# Patient Record
Sex: FEMALE | Race: WHITE | NOT HISPANIC OR LATINO | Employment: FULL TIME | URBAN - METROPOLITAN AREA
[De-identification: names, ages, dates, MRNs, and addresses within clinical notes are randomized per-mention and may not be internally consistent; named-entity substitution may affect disease eponyms.]

---

## 2024-09-17 ENCOUNTER — LAB (OUTPATIENT)
Dept: LAB | Facility: CLINIC | Age: 34
End: 2024-09-17
Payer: COMMERCIAL

## 2024-09-17 ENCOUNTER — OFFICE VISIT (OUTPATIENT)
Dept: SURGERY | Facility: CLINIC | Age: 34
End: 2024-09-17
Payer: COMMERCIAL

## 2024-09-17 VITALS
HEIGHT: 63 IN | SYSTOLIC BLOOD PRESSURE: 124 MMHG | WEIGHT: 230 LBS | DIASTOLIC BLOOD PRESSURE: 76 MMHG | BODY MASS INDEX: 40.75 KG/M2

## 2024-09-17 DIAGNOSIS — F32.A ANXIETY AND DEPRESSION: ICD-10-CM

## 2024-09-17 DIAGNOSIS — E88.819 INSULIN RESISTANCE: ICD-10-CM

## 2024-09-17 DIAGNOSIS — M79.7 FIBROMYALGIA: ICD-10-CM

## 2024-09-17 DIAGNOSIS — F41.9 ANXIETY AND DEPRESSION: ICD-10-CM

## 2024-09-17 DIAGNOSIS — E66.01 SEVERE OBESITY (BMI >= 40) (H): Primary | ICD-10-CM

## 2024-09-17 DIAGNOSIS — K21.9 GASTROESOPHAGEAL REFLUX DISEASE WITHOUT ESOPHAGITIS: ICD-10-CM

## 2024-09-17 DIAGNOSIS — E66.01 SEVERE OBESITY (BMI >= 40) (H): ICD-10-CM

## 2024-09-17 LAB
ALBUMIN SERPL BCG-MCNC: 4.3 G/DL (ref 3.5–5.2)
ALP SERPL-CCNC: 129 U/L (ref 40–150)
ALT SERPL W P-5'-P-CCNC: 10 U/L (ref 0–50)
ANION GAP SERPL CALCULATED.3IONS-SCNC: 10 MMOL/L (ref 7–15)
AST SERPL W P-5'-P-CCNC: 20 U/L (ref 0–45)
BILIRUB SERPL-MCNC: 0.3 MG/DL
BUN SERPL-MCNC: 8.6 MG/DL (ref 6–20)
CALCIUM SERPL-MCNC: 8.7 MG/DL (ref 8.8–10.4)
CHLORIDE SERPL-SCNC: 105 MMOL/L (ref 98–107)
CREAT SERPL-MCNC: 0.8 MG/DL (ref 0.51–0.95)
EGFRCR SERPLBLD CKD-EPI 2021: >90 ML/MIN/1.73M2
GLUCOSE SERPL-MCNC: 97 MG/DL (ref 70–99)
HBA1C MFR BLD: 5.4 % (ref 0–5.6)
HCO3 SERPL-SCNC: 25 MMOL/L (ref 22–29)
IRON BINDING CAPACITY (ROCHE): 330 UG/DL (ref 240–430)
IRON SATN MFR SERPL: 12 % (ref 15–46)
IRON SERPL-MCNC: 40 UG/DL (ref 37–145)
POTASSIUM SERPL-SCNC: 4.1 MMOL/L (ref 3.4–5.3)
PROT SERPL-MCNC: 7.6 G/DL (ref 6.4–8.3)
PTH-INTACT SERPL-MCNC: 58 PG/ML (ref 15–65)
SODIUM SERPL-SCNC: 140 MMOL/L (ref 135–145)
VIT D+METAB SERPL-MCNC: 16 NG/ML (ref 20–50)

## 2024-09-17 PROCEDURE — 83550 IRON BINDING TEST: CPT

## 2024-09-17 PROCEDURE — 83036 HEMOGLOBIN GLYCOSYLATED A1C: CPT

## 2024-09-17 PROCEDURE — 82607 VITAMIN B-12: CPT

## 2024-09-17 PROCEDURE — 83970 ASSAY OF PARATHORMONE: CPT

## 2024-09-17 PROCEDURE — 82306 VITAMIN D 25 HYDROXY: CPT

## 2024-09-17 PROCEDURE — 83540 ASSAY OF IRON: CPT

## 2024-09-17 PROCEDURE — 99205 OFFICE O/P NEW HI 60 MIN: CPT | Performed by: FAMILY MEDICINE

## 2024-09-17 PROCEDURE — 80053 COMPREHEN METABOLIC PANEL: CPT

## 2024-09-17 PROCEDURE — 36415 COLL VENOUS BLD VENIPUNCTURE: CPT

## 2024-09-17 RX ORDER — DULOXETIN HYDROCHLORIDE 60 MG/1
60 CAPSULE, DELAYED RELEASE ORAL
COMMUNITY
Start: 2022-02-10

## 2024-09-17 RX ORDER — HYDROXYZINE HYDROCHLORIDE 10 MG/1
TABLET, FILM COATED ORAL
COMMUNITY
Start: 2017-10-18

## 2024-09-17 RX ORDER — CLOBETASOL PROPIONATE 0.5 MG/ML
SOLUTION TOPICAL
COMMUNITY
Start: 2024-06-06

## 2024-09-17 RX ORDER — KETOCONAZOLE 20 MG/ML
SHAMPOO TOPICAL
COMMUNITY
Start: 2024-06-06

## 2024-09-17 RX ORDER — FLUOXETINE 40 MG/1
40 CAPSULE ORAL
COMMUNITY
Start: 2023-04-26

## 2024-09-17 RX ORDER — DULOXETIN HYDROCHLORIDE 30 MG/1
CAPSULE, DELAYED RELEASE ORAL
COMMUNITY
Start: 2023-05-10

## 2024-09-17 NOTE — LETTER
"2024      Alexandra Lira  [address]  [facility] [address]      Dear Colleague,    Thank you for referring your patient, Alexandra Lira, to the [facility] [place]. Please see a copy of my visit note below.        New Medical Weight Management Consult    PATIENT:  Alexandra Lira  MRN:         [id number]  :         1990  YIN:         2024    Dear Dr. Lua,    I had the pleasure of seeing your patient, Alexandra Lira. Full intake/assessment was done to determine barriers to weight loss success and develop a treatment plan. Alexandra Lira is a 34 year old female interested in treatment of medical problems associated with excess weight. She has a height of 5' 2.5\", a weight of 230 lbs 0 oz, and the calculated Body mass index is 41.4 kg/m . Injectables not covered under her 's insurance plan. She is changing to [facility] insurance soon. She was told that she had a fatty liver.     ASSESSMENT/PLAN:  Sedentary  Symproms of insulin resistance  Chronic pain limiting exercise  High Stress-using DBT coping skills  Sleep deprivation-playing video games  Skipping meals  Daily [employer]    Consider anti-inflammatory approach  Consistent stretching/movement  Changing to [facility] insurance in -will have GLP-1 RA avail (hopefully)  Labs  Liver ultrasound  Considering bariatric surgery then breast reduction      She has the following co-morbidities:        2024    10:11 AM   --   I have the following health issues associated with obesity None of the above   I have the following symptoms associated with obesity None of the above               2024    10:11 AM   Referring Provider   Please name the provider who referred you to Medical Weight Management  If you do not know, please answer \"I Don't Know\" [facility]           2024    10:11 AM   Weight History   How concerned are you about your weight? Very Concerned   I became overweight As " an Adult   The following factors have contributed to my weight gain Other   Please list the other factors pregnancy   I have tried the following methods to lose weight Exercise    Other   Please list the other methods nuum   My lowest weight since age 18 was 223   My highest weight since age 18 was 235   The most weight I have ever lost was (lbs) 15   I have the following family history of obesity/being overweight My father is overweight   How has your weight changed over the last year? Gained   How many pounds? 15           9/17/2024    10:11 AM   Diet Recall Review with Patient   If you do eat lunch, what types of food do you typically eat? salads   If you do snack, what types of food do you typically eat? ice cream   How many glasses of juice do you drink in a typical day? 1   How many of glasses of milk do you drink in a typical day? 1   If you do drink milk, what type? 2%   How many 8oz glasses of sugar containing drinks such as Todd-Aid/sweet tea do you drink in a day? 0   How many cans/bottles of sugar pop/soda/tea/sports drinks do you drink in a day? 1   How many cans/bottles of diet pop/soda/tea or sports drink do you drink in a day? 1   How often do you have a drink of alcohol? Monthly or Less   If you do drink, how many drinks might you have in a day? 1 or 2           9/17/2024    10:11 AM   Eating Habits   Generally, my meals include foods like these bread, pasta, rice, potatoes, corn, crackers, sweet dessert, pop, or juice A Few Times a Week   Generally, my meals include foods like these fried meats, brats, burgers, french fries, pizza, cheese, chips, or ice cream A Few Times a Week   Eat fast food (like McDonalds, [employer], Taco Bell) Once a Week   Eat at a buffet or sit-down restaurant Once a Week   Eat most of my meals in front of the TV or computer A Few Times a Week   Often skip meals, eat at random times, have no regular eating times Once a Week   Rarely sit down for a meal but snack or graze  throughout Once a Week   Eat extra snacks between meals A Few Times a Week   Eat most of my food at the end of the day A Few Times a Week   Eat in the middle of the night or wake up at night to eat A Few Times a Week   Eat extra snacks to prevent or correct low blood sugar A Few Times a Week   Eat to prevent acid reflux or stomach pain Once a Week   Worry about not having enough food to eat Never   I eat when I am depressed A Few Times a Week   I eat when I am stressed A Few Times a Week   I eat when I am bored A Few Times a Week   I eat when I am anxious Once a Week   I eat when I am happy or as a reward Once a Week   I feel hungry all the time even if I just have eaten Never   Feeling full is important to me Never   I finish all the food on my plate even if I am already full Never   I can't resist eating delicious food or walk past the good food/smell Never   I eat/snack without noticing that I am eating Once a Week   I eat when I am preparing the meal Never   I eat more than usual when I see others eating Never   I have trouble not eating sweets, ice cream, cookies, or chips if they are around the house A Few Times a Week   I think about food all day Never   What foods, if any, do you crave? Sweets/Candy/Chocolate   Please list any other foods you crave? sweets           9/17/2024    10:11 AM   Amount of Food   I feel out of control when eating Never   I eat a large amount of food, like a loaf of bread, a box of cookies, a pint/quart of ice cream, all at once Never   I eat a large amount of food even when I am not hungry Never   I eat rapidly Never   I eat alone because I feel embarrassed and do not want others to see how much I have eaten Never   I eat until I am uncomfortably full Never   I feel bad, disgusted, or guilty after I overeat Never           9/17/2024    10:11 AM   Activity/Exercise History   How much of a typical 12 hour day do you spend sitting? Half the Day   How much of a typical 12 hour day do  you spend lying down? Half the Day   How much of a typical day do you spend walking/standing? Half the Day   How many hours (not including work) do you spend on the TV/Video Games/Computer/Tablet/Phone? 4-5 Hours   How many times a week are you active for the purpose of exercise? 2-3 Times a Week   What keeps you from being more active? Lack of Time   How many total minutes do you spend doing some activity for the purpose of exercising when you exercise? 15-30 Minutes       PAST MEDICAL HISTORY:  Past Medical History:   Diagnosis Date     Anxiety and depression      Autism spectrum      Fibromyalgia      Gastroesophageal reflux disease without esophagitis      Hirsutism      Insulin resistance      Irregular menses      Positive CORRIE (antinuclear antibody)      Post partum depression      Severe obesity (BMI >= 40) (H)      Skin tag            9/17/2024    10:11 AM   Work/Social History Reviewed With Patient   My employment status is Full-Time   My job is [employer]   How much of your job is spent on the computer or phone? 100%   How many hours do you spend commuting to work daily? 0   What is your marital status? /In a Relationship   If in a relationship, is your significant other overweight? No   If you have children, are they overweight? No   Who do you live with? children/    Who does the food shopping? self           9/17/2024    10:11 AM   Mental Health History Reviewed With Patient   Have you ever been physically or sexually abused? No   How often in the past 2 weeks have you felt little interest or pleasure in doing things? Not at all   Over the past 2 weeks how often have you felt down, depressed, or hopeless? Not at all           9/17/2024    10:11 AM   Sleep History Reviewed With Patient   How many hours do you sleep at night? 5       MEDICATIONS:   Current Outpatient Medications   Medication Sig Dispense Refill     clobetasol (TEMOVATE) 0.05 % external solution Apply to scalp daily as needed  "itching.       DULoxetine (CYMBALTA) 30 MG capsule        DULoxetine (CYMBALTA) 60 MG capsule Take 60 mg by mouth.       FLUoxetine (PROZAC) 40 MG capsule Take 40 mg by mouth.       hydrOXYzine HCl (ATARAX) 10 MG tablet        ketoconazole (NIZORAL) 2 % external shampoo Lather on damp scalp, leave on for 5min, then rinse with water.         ALLERGIES:   Allergies   Allergen Reactions     Erythromycin Hives, Itching and Unknown     Hepatitis B Vaccine Recomb (3-Antigen) Unknown     Pertussis Vaccine Unknown     Bupropion Anxiety, Confusion, Difficulty breathing, Fatigue, Hives, Itching, Other (See Comments) and Rash     Possible allergy, or the Keflex   Possible allergy, or the Keflex    Possible allergy, or the Keflex     Cephalexin Anxiety, Confusion, Fatigue, Hives, Itching, Muscle Pain (Myalgia), Other (See Comments) and Rash     Possible allergy or Wellbutrin    Possible allergy or Wellbutrin     Possible allergy or Wellbutrin    Pt tolerated IV Cefazolin/Ancef at  without issue  &        PHYSICAL EXAM:  /76   Ht 1.588 m (5' 2.5\")   Wt 104.3 kg (230 lb)   LMP 2024 (Exact Date)   BMI 41.40 kg/m      Waist circumference: 39 cm (hips 50)    Wt Readings from Last 4 Encounters:   24 104.3 kg (230 lb)   Pleasant and in no distress  Neck 13\" Mallampati 3+   HEENT: poor dental hygiene  Heart regular  Lungs clear  Abdominal circumference 39\"  Respirations unlabored  Gait normal    FOLLOW-UP:   scheduled.    TIME: 60 min spent on evaluation, management, counseling, education, & motivational interviewing     Sincerely,    Vidya Abreu MD            Again, thank you for allowing me to participate in the care of your patient.        Sincerely,        Vidya Abreu MD  "

## 2024-09-17 NOTE — PATIENT INSTRUCTIONS
[facility]    Remember to: call [phone] to schedule your ultrasound    -Eat 3 meals a day (not 2, not 5) Chew your food well/SLOW down  -Eat your protein first  -Be a water drinker/Minize liquid calories (no regular pop, no juice) skim or 1% milk OK  -Sleep 7-8 hours each night. Address sleep if problematic  -Stress management is important. Address if problematic  -Move-8000 steps daily Muscle: maintain your muscle mass (strength training 2X/wk)  -Wheat, not white (bread, pasta, crackers, tatyana, bagels, tortillas, rice)  -Limit restaurant, cafeteria, take out, drive through to 2 times per week or less  -Minimize caffeine, alcohol, and night-time snacking  -Consider keeping a food diary (i.e. My Fitness Pal, Lose It, or other food tracker)  -Follow up with the dietitian      **Some lean proteins: chicken, turkey, tuna, salmon, crab, fish, shrimp, scallops, lobster, lean cuts of beef and pork, luncheon meats, veggie burgers, beans (black, lima, garbanzo, burton, kidney, refried), chile, cottage cheese, string cheese, other cheese, eggs, tofu, peanut butter, nuts, vegan crumbles, greek yogurt    MEDICATIONS FOR WEIGHT LOSS    PHENTERMINE (Adipex): approved in 1959 for appetite suppression.  It has stimulant effects and cannot be used with Ritalin, Concerta, or other stimulants.  It is not addictive although it's chemically related to amphetamines.  Amphetamines are addictive. The most common side effects are dry mouth, increased energy and concentration, increased pulse, and constipation.  You should not take phentermine if you have glaucoma, hyperthyroidism, or uncontrolled/untreated hypertension.  $24-$30 for 90 tablets    PHENDIMETRAZINE (Bontril): Appetite suppressant/sympathomimetic.  Controlled substance.  Side effects and contraindications similar to phentermine.  $45-$60 for 3 month supply    TOPIRAMATE (Topamax): Anti-seizure medication, also used to prevent migraines.  Side effects include  paresthesia, glaucoma, altered concentration, attention difficulties, memory and speech problems, metabolic acidosis, depression, increase in body temperature and decrease sweating, kidney stones, and weight loss.  Do not take Topamax while taking Depakote as this can cause high ammonia levels.  You must have reliable birth control as Topamax can cause birth defects.  Discontinue slowly to avoid seizure.  Insurance usually covers Topiramate.    QSYMIA (Phentermine + Topamax):  See above information about phentermine and Topamax.  Most common side effects are paresthesia, dizziness, distortion of taste, insomnia, constipation, and dry mouth.  $150-$220 per month    DIETHYLPROPION (Tenuate): Sympathomimetic amine.  Appetite suppressant.  Doses 25 mg before meals or 75 mg per day.  Most common side effects are hypertension, palpitations, EKG changes, and increased seizures in epileptics.  There can be a possible adverse reaction with alcohol.  $70-$90 per 3 months    XENICAL(Orlistat) (Santi OTC): Approved in 1999.  A fat-blocker.  It reduces absorption of fat by approximately 30%.  It has beneficial effects on lipid levels.  Side effects include diarrhea, abdominal cramping, fecal incontinence, oily spotting, and flatus with discharge.  Side effects are minimized if the patient limits their dietary fat to no more than 30% of their diet.  Patients must take a multivitamin daily to avoid vitamin D, E, A, and K deficiency.  $120 per month    CONTRAVE (Naltrexone/Bupropion): Approved in 2014.  It is a combination pill including an opioid receptor blocker and a long-standing antidepressant.  Most common side effects include nausea, constipation, headache, vomiting, dizziness, trouble sleeping, dry mouth, and diarrhea.  With all antidepressants watch for mood changes and suicide ideation.  Bupropion has been known to lower the seizure threshold in those prone to seizures.  It should not be used in a patient with a recent  history of bulimia. It has been associated with liver damage from taking higher than recommended doses.  Do not use countrave if you have taken opioid medications or opioid street drugs in the past 7-10 days, if you are currently on opioids, methadone, or if you are pregnant.  Do not use contrave if you have recently stopped using alcohol or benzodiazepines.  Taper off contrave slowly.  Dosing: titrate up to 2 tablets twice daily of the Naltrexone 8 mg/ Bupropion 90 mg tablets.  $200 for 90 tablets    SAXENDA (Liraglutide (called Victoza for Type 2 Diabetes): A daily injectable (3mg daily) medication used for type 2 diabetes (Victoza). Glucagon-like peptide-1 (GLP-1) agonist. Contraindications include personal or family history of medullary thyroid cancer or MEN type 2. Acute pancreatitis has been observed in patients taking liraglutide. Liraglutide causes C-cell tumors in rats and mice. It is unknown whether liraglutide causes tumors in humans. Start at 0.6mg, increasing the dose weekly up to 3mg.     VYVANSE (Lisdexamfetamine dimesylate): a CNS stimulant used to treat ADHD. Indicated for the treatment of moderate to severe Binge Eating Disorder in Adults. Contraindicated in patients with known heart disease, structural abnormalities of the heart, serious heart arrhythmias or unexplained syncope. CNS stimulants such as vyvanse may cause manic or psychotic symptoms in patients with BPAD or pre-existing psychosis. Use with caution in patients with Raynaud's phenomenon. Most common side effects include dry mouth, insomnia, decreased appetite, increased heart rate, jittery feeling, constipation and anxiety.     WEGOVY (Semaglutide (called Ozempic for Type 2 Diabetes): A weekly injectable (2.4mg weekly) medication used for type 2 diabetes (Ozempic). Glucagon-like peptide-1 (GLP-1) agonist. Contraindications include personal or family history of medullary thyroid cancer or MEN type 2. Acute pancreatitis has been observed  in patients taking Semaglutide. Semaglutide causes C-cell tumors in rats and mice. It is unknown whether Semaglutide causes tumors in humans. Start at 0.25mg, increasing to 0.5, 1.0, 1.7 then 2.4 monthly. $1200/mo    ZEPBOUND (Tirzepatide (called Mounjaro for Type 2 Diabetes): A weekly injectable medication indicated for type 2 diabetes in 2022 and for weight loss in 2023.. Glucagon-like-peptide-1 (GLP-1) agonist and Glucose-Dependent Insulinotropic Polypeptide (GIP) agonist. Contraindications include personal or family history of medullary thyroid cancer or MEN type 2. Acute pancreatitis has been observed in patients taking Tirzepatide. Tirzepatide causes C-cell tumors in rats and mice. It is unknown whether Tirzepatide causes tumors in humans. Watch for neck mass, difficulty swallowing, persistent hoarseness, and epigastric abdominal pain. Most common side effects include nausea, diarrhea, vomiting, constipation, dyspepsia, and abdominal pain. Start at 2.5mg, increasing to 5.0, 7.5, 10, 12.5 then 15mg monthly. $1,000/month     Thinking about bariatric surgery or simply want to learn more about it?  Go to [email or web address].

## 2024-09-17 NOTE — PROGRESS NOTES
"    New Medical Weight Management Consult    PATIENT:  Alexandra Lira  MRN:         [id number]  :         1990  YIN:         2024    Dear Dr. Lua,    I had the pleasure of seeing your patient, Alexandra Lira. Full intake/assessment was done to determine barriers to weight loss success and develop a treatment plan. Alexandra Lira is a 34 year old female interested in treatment of medical problems associated with excess weight. She has a height of 5' 2.5\", a weight of 230 lbs 0 oz, and the calculated Body mass index is 41.4 kg/m . Injectables not covered under her 's insurance plan. She is changing to [facility] insurance soon. She was told that she had a fatty liver.     ASSESSMENT/PLAN:  Sedentary  Symproms of insulin resistance  Chronic pain limiting exercise  High Stress-using DBT coping skills  Sleep deprivation-playing video games  Skipping meals  Daily Starbucks    Consider anti-inflammatory approach  Consistent stretching/movement  Changing to [facility] insurance in -will have GLP-1 RA avail (hopefully)  Labs  Liver ultrasound  Considering bariatric surgery then breast reduction      She has the following co-morbidities:        2024    10:11 AM   --   I have the following health issues associated with obesity None of the above   I have the following symptoms associated with obesity None of the above               2024    10:11 AM   Referring Provider   Please name the provider who referred you to Medical Weight Management  If you do not know, please answer \"I Don't Know\" [facility]           2024    10:11 AM   Weight History   How concerned are you about your weight? Very Concerned   I became overweight As an Adult   The following factors have contributed to my weight gain Other   Please list the other factors pregnancy   I have tried the following methods to lose weight Exercise    Other   Please list the other methods nuum   My lowest weight since age 18 was 223   My " highest weight since age 18 was 235   The most weight I have ever lost was (lbs) 15   I have the following family history of obesity/being overweight My father is overweight   How has your weight changed over the last year? Gained   How many pounds? 15           9/17/2024    10:11 AM   Diet Recall Review with Patient   If you do eat lunch, what types of food do you typically eat? salads   If you do snack, what types of food do you typically eat? ice cream   How many glasses of juice do you drink in a typical day? 1   How many of glasses of milk do you drink in a typical day? 1   If you do drink milk, what type? 2%   How many 8oz glasses of sugar containing drinks such as Todd-Aid/sweet tea do you drink in a day? 0   How many cans/bottles of sugar pop/soda/tea/sports drinks do you drink in a day? 1   How many cans/bottles of diet pop/soda/tea or sports drink do you drink in a day? 1   How often do you have a drink of alcohol? Monthly or Less   If you do drink, how many drinks might you have in a day? 1 or 2           9/17/2024    10:11 AM   Eating Habits   Generally, my meals include foods like these bread, pasta, rice, potatoes, corn, crackers, sweet dessert, pop, or juice A Few Times a Week   Generally, my meals include foods like these fried meats, brats, burgers, french fries, pizza, cheese, chips, or ice cream A Few Times a Week   Eat fast food (like [employer], [employer], [employer]aco [employer]) Once a Week   Eat at a buffet or sit-down restaurant Once a Week   Eat most of my meals in front of the TV or computer A Few Times a Week   Often skip meals, eat at random times, have no regular eating times Once a Week   Rarely sit down for a meal but snack or graze throughout Once a Week   Eat extra snacks between meals A Few Times a Week   Eat most of my food at the end of the day A Few Times a Week   Eat in the middle of the night or wake up at night to eat A Few Times a Week   Eat extra snacks to prevent or correct low blood  sugar A Few Times a Week   Eat to prevent acid reflux or stomach pain Once a Week   Worry about not having enough food to eat Never   I eat when I am depressed A Few Times a Week   I eat when I am stressed A Few Times a Week   I eat when I am bored A Few Times a Week   I eat when I am anxious Once a Week   I eat when I am happy or as a reward Once a Week   I feel hungry all the time even if I just have eaten Never   Feeling full is important to me Never   I finish all the food on my plate even if I am already full Never   I can't resist eating delicious food or walk past the good food/smell Never   I eat/snack without noticing that I am eating Once a Week   I eat when I am preparing the meal Never   I eat more than usual when I see others eating Never   I have trouble not eating sweets, ice cream, cookies, or chips if they are around the house A Few Times a Week   I think about food all day Never   What foods, if any, do you crave? Sweets/Candy/Chocolate   Please list any other foods you crave? sweets           9/17/2024    10:11 AM   Amount of Food   I feel out of control when eating Never   I eat a large amount of food, like a loaf of bread, a box of cookies, a pint/quart of ice cream, all at once Never   I eat a large amount of food even when I am not hungry Never   I eat rapidly Never   I eat alone because I feel embarrassed and do not want others to see how much I have eaten Never   I eat until I am uncomfortably full Never   I feel bad, disgusted, or guilty after I overeat Never           9/17/2024    10:11 AM   Activity/Exercise History   How much of a typical 12 hour day do you spend sitting? Half the Day   How much of a typical 12 hour day do you spend lying down? Half the Day   How much of a typical day do you spend walking/standing? Half the Day   How many hours (not including work) do you spend on the TV/Video Games/Computer/Tablet/Phone? 4-5 Hours   How many times a week are you active for the purpose  of exercise? 2-3 Times a Week   What keeps you from being more active? Lack of Time   How many total minutes do you spend doing some activity for the purpose of exercising when you exercise? 15-30 Minutes       PAST MEDICAL HISTORY:  Past Medical History:   Diagnosis Date    Anxiety and depression     Autism spectrum     Fibromyalgia     Gastroesophageal reflux disease without esophagitis     Hirsutism     Insulin resistance     Irregular menses     Positive CORRIE (antinuclear antibody)     Post partum depression     Severe obesity (BMI >= 40) (H)     Skin tag            9/17/2024    10:11 AM   Work/Social History Reviewed With Patient   My employment status is Full-Time   My job is [employer]   How much of your job is spent on the computer or phone? 100%   How many hours do you spend commuting to work daily? 0   What is your marital status? /In a Relationship   If in a relationship, is your significant other overweight? No   If you have children, are they overweight? No   Who do you live with? children/    Who does the food shopping? self           9/17/2024    10:11 AM   Mental Health History Reviewed With Patient   Have you ever been physically or sexually abused? No   How often in the past 2 weeks have you felt little interest or pleasure in doing things? Not at all   Over the past 2 weeks how often have you felt down, depressed, or hopeless? Not at all           9/17/2024    10:11 AM   Sleep History Reviewed With Patient   How many hours do you sleep at night? 5       MEDICATIONS:   Current Outpatient Medications   Medication Sig Dispense Refill    clobetasol (TEMOVATE) 0.05 % external solution Apply to scalp daily as needed itching.      DULoxetine (CYMBALTA) 30 MG capsule       DULoxetine (CYMBALTA) 60 MG capsule Take 60 mg by mouth.      FLUoxetine (PROZAC) 40 MG capsule Take 40 mg by mouth.      hydrOXYzine HCl (ATARAX) 10 MG tablet       ketoconazole (NIZORAL) 2 % external shampoo Lather on damp  "scalp, leave on for 5min, then rinse with water.         ALLERGIES:   Allergies   Allergen Reactions    Erythromycin Hives, Itching and Unknown    Hepatitis B Vaccine Recomb (3-Antigen) Unknown    Pertussis Vaccine Unknown    Bupropion Anxiety, Confusion, Difficulty breathing, Fatigue, Hives, Itching, Other (See Comments) and Rash     Possible allergy, or the Keflex   Possible allergy, or the Keflex    Possible allergy, or the Keflex    Cephalexin Anxiety, Confusion, Fatigue, Hives, Itching, Muscle Pain (Myalgia), Other (See Comments) and Rash     Possible allergy or Wellbutrin    Possible allergy or Wellbutrin     Possible allergy or Wellbutrin    Pt tolerated IV Cefazolin/Ancef at  without issue  &        PHYSICAL EXAM:  /76   Ht 1.588 m (5' 2.5\")   Wt 104.3 kg (230 lb)   LMP 2024 (Exact Date)   BMI 41.40 kg/m      Waist circumference: 39 cm (hips 50)    Wt Readings from Last 4 Encounters:   24 104.3 kg (230 lb)   Pleasant and in no distress  Neck 13\" Mallampati 3+   HEENT: poor dental hygiene  Heart regular  Lungs clear  Abdominal circumference 39\"  Respirations unlabored  Gait normal    FOLLOW-UP:   scheduled.    TIME: 60 min spent on evaluation, management, counseling, education, & motivational interviewing     Sincerely,    Vidya Abreu MD        "

## 2024-09-18 LAB — VIT B12 SERPL-MCNC: 308 PG/ML (ref 232–1245)

## 2024-09-18 NOTE — PROGRESS NOTES
Alexandra Lira is a 34 year old who is being evaluated via a billable video visit.      How would you like to obtain your AVS? MyChart  If the video visit is dropped, the invitation should be resent by: Text to cell phone: [phone]  Will anyone else be joining your video visit? No          Medical Weight Loss Initial Diet Evaluation  Assessment:  This patient was referred by Dr. Abreu for MNT as treatment for Morbid obesity       Alexandra is presenting today for a new weight management nutrition consultation. Pt has had an initial appointment with Dr. Abreu.    Weight loss medication:  none at this time - possibly changing insurances in Jan 2025 .     Anthropometrics:    Initial weight: 230 lbs  BMI: 41.40  Ideal body weight: 51.3 kg (112 lb 15.8 oz)  Adjusted ideal body weight: 72.5 kg (159 lb 12.7 oz)  Estimated RMR ([place]-St Jeor equation):  2,113 kcals x 1.2 (sedentary) = 2,535 kcals (for weight maintenance)    Recommended Protein Intake: 80 - 100 grams of protein/day  Recommended Calorie Intake: 1,900 - 2,000 kcals/day    Medical History:  Patient Active Problem List   Diagnosis    Fibromyalgia    Gastroesophageal reflux disease without esophagitis    Anxiety and depression    Post partum depression   Diabetes: no, A1C of 5.4% on 9/18/2024    Nutrition History:   Food allergies/intolerances/cultural or religous food customs: No. Stomach ache with too many eggs.     Weight loss history: exercise, Noom. Patient has been having a lot of random/extreme pain and has been struggling with weight loss. Patient has been watching sugar and CHO intake. Patient was previously working out for 1 hour 3x/week on the step machine/elliptical then weight lifting. Patient had weight gain since her pregnancies - struggled with post-partum depression. Patient has a heightened fight or flight respond - recently dx with autism.     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    Vitamins/Mineral Supplementation: none  Plant based protein powder daily    Dietary Recall:  Breakfast: Starbucks order  Lunch: small salads (pre-made) OR Skipping  Dinner: smoothie (strawberries, blueberries, bananas, unsweetened almond milk, greek yogurt)  Typical Snacks: trying to cut back - apples, rice cakes  Overnight eating: Yes - happens frequently but has been trying to cut back d/t fatty liver dx  Eating Out: 1-2x/week    Beverages:   Starbucks - slowly cutting back on the size - Strawberry Acsi drink with water - switching from a cookie to a sweeney and gouda sandwich    Exercise:   Previously working out for 1 hour 3x/week on the step machine/elliptical then weight lifting.     Nutrition Diagnosis (PES statement):   Morbid obesity related to excessive energy intake as evidence by BMI of 41.40     Disordered Eating Pattern (NB 1.5) related to obsessive desire, intake of food exceeding RMR as evidenced by binge eating habits, skipping meals     Nutrition Intervention  Food and/or Nutrient Delivery   Placed emphasis on importance of developing a healthy meal routine, aiming for 3 meals a day and limited snacks.  Fuel every 4-5 hours  Discussed using a protein supplement as nutrition support  Nutrition Education   Discussed with patient how to build a meal: the importance of including a lean/low fat protein at each meal, include a source of vegetables at a minimum of lunch and dinner and limiting carbohydrate intake to 30-45 grams CHO per meal.  Educated on sources of lean protein, portion sizes, the amount of grams found in each source. Recommend patient to aim for 25-30g protein at each meal.  Discussed the importance  of adequate hydration, with emphasis on drinking 64oz of water or zero calorie beverages per day.  Nutrition Counseling   Encouraged importance of developing routine exercise for health benefits and weight loss.    Goals established by patient:   Limit skipping meals - stay consistent with three meals per day  Aim for 25-30 grams of protein per day  Aim to fuel every 4-5 hours  Limit CHO intake to 30 - 45 g/meal    Handouts provided:  Intro to MWM  Quick and Easy Meals    Assessment/Plan:    Pt will follow up in 5 month(s) with bariatrician and PRN with dietitian.       Video-Visit Details    Type of service:  Video Visit    Video Start Time (time video started): 11:26 AM    Video End Time (time video stopped): 11:58 AM    Originating Location (pt. Location): Home      Distant Location (provider location):  Off-site    Mode of Communication:  Video Conference via [facility]    Physician has received verbal consent for a Video Visit from the patient? Yes      Amy Guardado RD

## 2024-09-19 ENCOUNTER — TELEPHONE (OUTPATIENT)
Dept: SURGERY | Facility: CLINIC | Age: 34
End: 2024-09-19

## 2024-09-19 ENCOUNTER — ANCILLARY PROCEDURE (OUTPATIENT)
Dept: ULTRASOUND IMAGING | Facility: CLINIC | Age: 34
End: 2024-09-19
Attending: FAMILY MEDICINE
Payer: COMMERCIAL

## 2024-09-19 DIAGNOSIS — K76.89 NODULE ON LIVER: Primary | ICD-10-CM

## 2024-09-19 DIAGNOSIS — E88.819 INSULIN RESISTANCE: ICD-10-CM

## 2024-09-19 DIAGNOSIS — K76.0 HEPATIC STEATOSIS: ICD-10-CM

## 2024-09-19 DIAGNOSIS — E66.01 SEVERE OBESITY (BMI >= 40) (H): ICD-10-CM

## 2024-09-19 PROCEDURE — 76705 ECHO EXAM OF ABDOMEN: CPT | Mod: TC | Performed by: RADIOLOGY

## 2024-09-20 ENCOUNTER — VIRTUAL VISIT (OUTPATIENT)
Dept: SURGERY | Facility: CLINIC | Age: 34
End: 2024-09-20
Payer: COMMERCIAL

## 2024-09-20 DIAGNOSIS — E66.01 MORBID OBESITY WITH BMI OF 40.0-44.9, ADULT (H): Primary | ICD-10-CM

## 2024-09-20 DIAGNOSIS — K76.0 FATTY LIVER: ICD-10-CM

## 2024-09-20 DIAGNOSIS — Z71.3 NUTRITIONAL COUNSELING: ICD-10-CM

## 2024-09-20 PROCEDURE — 97802 MEDICAL NUTRITION INDIV IN: CPT | Mod: 95 | Performed by: DIETITIAN, REGISTERED

## 2024-09-20 NOTE — LETTER
9/20/2024      Alexandra Lira  [address]  [place] [address]      Dear Colleague,    Thank you for referring your patient, Alexandra Lira, to the [facility] [place]. Please see a copy of my visit note below.    Alexandra Lira is a 34 year old who is being evaluated via a billable video visit.      How would you like to obtain your AVS? MyChart  If the video visit is dropped, the invitation should be resent by: Text to cell phone: [phone]  Will anyone else be joining your video visit? No          Medical Weight Loss Initial Diet Evaluation  Assessment:  This patient was referred by Dr. Abreu for MNT as treatment for Morbid obesity       Alexandra is presenting today for a new weight management nutrition consultation. Pt has had an initial appointment with Dr. Abreu.    Weight loss medication:  none at this time - possibly changing insurances in Jan 2025 .     Anthropometrics:    Initial weight: 230 lbs  BMI: 41.40  Ideal body weight: 51.3 kg (112 lb 15.8 oz)  Adjusted ideal body weight: 72.5 kg (159 lb 12.7 oz)  Estimated RMR ([place]-St Jeor equation):  2,113 kcals x 1.2 (sedentary) = 2,535 kcals (for weight maintenance)    Recommended Protein Intake: 80 - 100 grams of protein/day  Recommended Calorie Intake: 1,900 - 2,000 kcals/day    Medical History:  Patient Active Problem List   Diagnosis     Fibromyalgia     Gastroesophageal reflux disease without esophagitis     Anxiety and depression     Post partum depression   Diabetes: no, A1C of 5.4% on 9/18/2024    Nutrition History:   Food allergies/intolerances/cultural or religous food customs: No. Stomach ache with too many eggs.     Weight loss history: exercise, Noom. Patient has been having a lot of random/extreme pain and has been struggling with weight loss. Patient has been watching sugar and CHO intake. Patient was previously working out for 1 hour 3x/week on the step machine/elliptical then weight  lifting. Patient had weight gain since her pregnancies - struggled with post-partum depression. Patient has a heightened fight or flight respond - recently dx with autism.     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    Vitamins/Mineral Supplementation: none  Plant based protein powder daily    Dietary Recall:  Breakfast: Starbucks order  Lunch: small salads (pre-made) OR Skipping  Dinner: smoothie (strawberries, blueberries, bananas, unsweetened almond milk, greek yogurt)  Typical Snacks: trying to cut back - apples, rice cakes  Overnight eating: Yes - happens frequently but has been trying to cut back d/t fatty liver dx  Eating Out: 1-2x/week    Beverages:   Starbucks - slowly cutting back on the size - Strawberry Acsi drink with water - switching from a cookie to a sweeney and gouda sandwich    Exercise:   Previously working out for 1 hour 3x/week on the step machine/elliptical then weight lifting.     Nutrition Diagnosis (PES statement):   Morbid obesity related to excessive energy intake as evidence by BMI of 41.40     Disordered Eating Pattern (NB 1.5) related to obsessive desire, intake of food exceeding RMR as evidenced by binge eating habits, skipping meals     Nutrition Intervention  Food and/or Nutrient Delivery   Placed emphasis on importance of developing a healthy meal routine, aiming for 3 meals a day and limited snacks.  Fuel every 4-5 hours  Discussed using a protein supplement as nutrition support  Nutrition Education   Discussed with patient how to build a meal: the importance of including a lean/low fat protein at each meal, include a source of vegetables at a  minimum of lunch and dinner and limiting carbohydrate intake to 30-45 grams CHO per meal.  Educated on sources of lean protein, portion sizes, the amount of grams found in each source. Recommend patient to aim for 25-30g protein at each meal.  Discussed the importance of adequate hydration, with emphasis on drinking 64oz of water or zero calorie beverages per day.  Nutrition Counseling   Encouraged importance of developing routine exercise for health benefits and weight loss.    Goals established by patient:   Limit skipping meals - stay consistent with three meals per day  Aim for 25-30 grams of protein per day  Aim to fuel every 4-5 hours  Limit CHO intake to 30 - 45 g/meal    Handouts provided:  Intro to MWM  Quick and Easy Meals    Assessment/Plan:    Pt will follow up in 5 month(s) with bariatrician and PRN with dietitian.       Video-Visit Details    Type of service:  Video Visit    Video Start Time (time video started): 11:26 AM    Video End Time (time video stopped): 11:58 AM    Originating Location (pt. Location): Home      Distant Location (provider location):  Off-site    Mode of Communication:  Video Conference via [facility]    Physician has received verbal consent for a Video Visit from the patient? Yes      Amy Guardado RD         Again, thank you for allowing me to participate in the care of your patient.        Sincerely,        Amy Guardado RD

## 2024-09-20 NOTE — TELEPHONE ENCOUNTER
Spoke to Alexandra who had viewed her liver ultrasound which showed hepatic steatosis and an incidental 1.7cm solid nodule. Explained that it is not uncommon to find things that we are not looking for when doing imaging studies. With her age, absence of cirrhosis or malignancy, it is most likely a benign finding. MRI can clarify the finding. She has had an MRI previously on another body part so is familiar with the study. She would like to follow through with the radiologist's suggestion. MRI was ordered. She was given the number to call to schedule.

## 2024-09-20 NOTE — PATIENT INSTRUCTIONS
Eat Better ? Move More ? Live Well    Eat 3 nutrient-rich meals each day     Don't skip meals--it will cause you to overeat later in the day!     Eating fiber (vegetables/fruits/whole grains) and protein with meals helps you stay full longer     Choose foods with less than 10 grams of sugar and 5 grams of fat per serving to prevent excess calories and weight re-gain   Eat around the same times each day to develop a routine eating schedule    Avoid snacking unless physically hungry.   Planned snacks: 1-2 times per day and no more than 150 calories    Eat protein first    Protein helps with healing, maintaining adequate muscle mass, reducing hunger and optimizing nutritional status    Aim for 80 - 100 grams of protein per day   Fill up on Fiber    Fiber comes from plants--fruits, veggies, whole grains, nuts/seeds and beans    Fiber is low in calories, high in phytonutrients and helps you stay full longer    Aim for 25-35 grams per day by eating fiber with meals and snacks  Eat [id number]    Take 20-30 minutes to eat each meal by taking small bites, chewing foods to applesauce consistency or 20-30 times before you swallow    Eating foods too fast can delay satiety/fullness signals and increase overeating   Slow down your eating by using toddler utensils, putting your fork/spoon down between bites and not watching TV or emailing during meals!   Keep a Journal          Writing down what you eat, how you feel and when you are active helps you identify new changes to work on from week to week          Look for ways to cut 100 calories from your current diet 2-3 times per day  Drink 64 ounces of 0-Calorie drinks between meals    Water    Zero calorie Propel  or Vitamin Water      SoBe Lifewater  Zero Calories    Crystal Light , Sugar-Free Todd-Aid , and other sugar-free lemonade or flavored murphy    Keep Caffeine to less than 300mg per day ie: 3-6oz cups coffee     Work up to 45-60 minutes of physical activity most days  of the week    Helps with losing weight and prevent regaining those extra pounds!     Do a combo of cardio (walking/water exercises) and strength training (lifting weights/Vinyasa yoga)    Avoid Mindless Eating    Be present when you eat--take note of the smell, taste and quality of your food    Make a list of alternative activities you could do to prevent eating out of boredom/stress  Go for a walk, call a friend, chew gum, paint your nails, re-organize the garage, etc      LEAN PROTEIN SOURCES    Protein Source Portion Calories Grams of Protein                           Nonfat, plain Greek yogurt    (10 grams sugar or less) 3/4 cup (6 oz)  12-17   Light Yogurt (10 grams sugar or less) 3/4 cup (6 oz)  6-8   Protein Shake 1 shake 110-180 15-30   Skim/1% Milk or lactose-free milk 1 cup ( 8 oz)  8   Plain or light, flavored soymilk 1 cup  7-8   Plain or light, hemp milk 1 cup 110 6   Fat Free or 1% Cottage Cheese 1/2 cup 90 15   Part skim ricotta cheese 1/2 cup 100 14   Part skim or reduced fat cheese slices 1/4cup, 3 dice 65-80 8     Mozzarella String Cheese 1 80 8   Canned tuna, chicken, crab or salmon  (canned in water)  1/2 cup 100 15-20   White fish (broiled, grilled, baked) 3 ounces 100 21   [place]/Tuna (broiled, grilled, baked) 3 ounces 150-180 21   Shrimp, Scallops, Lobster, Crab 3 ounces 100 21   Pork loin, Pork Tenderloin 3 ounces 150 21   Boneless, skinless chicken /turkey breast                          (broiled, grilled, baked) 3 ounces 120 21   [place], [place], [place], and Venison 3 ounces 120 21   Lean cuts of red meat and pork (sirloin,   round, tenderloin, flank, ground 93%-96%) 3 ounces 170 21   Lean or Extra Lean Ground Turkey 1/2 cup 150 20   90-95% Lean [place] Burger 1 [place] 140-180 21   Low-fat casserole with lean meat 3/4 cup 200 17   Luncheon Meats                                                        (turkey, lean ham, roast beef, chicken) 3 ounces 100 21   Egg (boiled,  poached, scrambled) 1 Egg 60 7   Egg Substitute 1/2 cup 70 10   Nuts (limit to 1 serving per day)  3 Tbsp. 150 7   Nut [place] (peanut, almond)  Limit to 1 serving or less daily 1 Tbsp. 90 4   Soy Burger (varies) 1  10-15   Edamame  1/2 cup ~95 9   Garbanzo, Black, Turner Beans 1/2 cup 110 7   Refried Beans 1/2 cup 100 7   Kidney and Lima beans 1/2 cup 110 7   Tempeh 3 oz 175 18   Vegan crumbles 1/2 cup 100 14   Tofu 1/2 cup 110 14   Chili (beans and extra lean beef or turkey) 1 cup 200 23   Lentil Stew/Soup 1 cup 150 12   Black Bean Soup 1 cup 175 12     Carbohydrates  Carbohydrates fuel your body with glucose (sugar)--the energy your body needs so you can do your daily activities.  Carbohydrates offer an immediate source of energy for your body. They provide the fuel for your muscles and organs, such as your brain.     Types of Carbohydrates     Complex Carbohydrates are higher in fiber and keep you feeling full longer--helping you eat less.   These are found in nearly all plant-based foods and usually take longer for the body to digest.  They are most commonly found in whole-wheat bread, whole-grain pasta, brown rice, starchy vegetables,   and fruits  Refined Carbohydrates require almost NO WORK for digestion and break down into glucose more quickly   than complex carbohydrates. Refined carbohydrates are usually high in calories and low in nutrients and fiber--  eating more of these can lead to weight gain.  Thinking about eliminating carbohydrates???  If you do not eat enough carbohydrates, the following can occur:  Fatigue  Muscle cramps  Poor mental function  Fatigue easily results from deprivation of carbohydrates, which is seen in people who fast, possibly interfering with activities of daily living.      Thinking about eliminating carbohydrates???  If you do not eat enough carbohydrates, the following can occur:  Fatigue  Muscle cramps  Poor mental function  Fatigue easily results from deprivation of  carbohydrates, which is seen in people who fast, possibly interfering with activities of daily living    Carbohydrates are your body's first choice for fuel. If given a choice of several types of foods simultaneously, your body will use the energy from carbohydrates first.    What foods contain carbohydrates?  Choose the following foods containing carbohydrates (the BEST ones to eat):   Fruit-fresh, frozen, canned in their own juices  Whole grains:  Whole-wheat breads  Brown rice  Oatmeal  Whole-grain cereals  Other starchy foods containing a minimum of 3 grams (g) fiber/100 calories  The ingredient label should list whole wheat or whole grain as one of the first ingredients (bulgur, quinoa, buckwheat, millet, spelt, faro, kasha)  Milk or yogurt (a natural source of carbohydrates):  Low-fat milk  Fat-free milk  Yogurt   Beans or legumes     Starchy vegetables, raw or frozen:  Potatoes  Peas  Corn    AVOID or limit the following foods containing carbohydrates:  Refined sugars, such as in:  Candy  Desserts-ice cream, cakes, pies, brownies, frozen yogurt, sherbet/sorbet  Cookies  White flour: bread/pasta/crackers/rice/tortillas  Sugary snacks: sweetened cereal, granola bars, cereal bars, donuts, muffins, bagels  Sugary Drinks:  Fruit Juice, Smoothies  Sports Drinks  Regular Soda    What are typical serving sizes or portions?  The following are some serving and portion sizes for foods containing carbohydrates:  One medium piece of fruit, about 4?5 ounces (oz) (-tennis ball)  1 cup (C) berries or melon    C canned fruit    C juice (100% vegetable)    C starchy vegetables, cooked or chopped  One slice whole-grain bread  ? C brown rice, quinoa, buckwheat, millet, spelt, faro, kasha    C oatmeal (dry)    C bulgur  One small tortilla (less than 6inch diameter)    C wheat germ  1 oz pretzels     C flaked cereal        Calorie-Controlled Sample Meal Plans    Examples of small healthy meals    Breakfast   Omelet made with   cup  to   cup egg substitute or 2 eggs    cup chopped vegetables  1-2 tbsp. of light cheese     cup salsa  Medium banana    1 cup non-fat plain, Greek yogurt mixed with 1 cup berries and 1-2 Tbsp nuts or cereal   -3/4 cup skim or 1% cottage cheese    cup unsweetened whole-grain cereal  1/2 cup of fresh strawberries  Whole-wheat English muffin or mini bagel, 1 scrambled egg and 1 slice Swiss cheese   Small orange  Protein Bar or Shake (15-30 grams protein and 15-25 grams Carbohydrates)    cup cottage cheese, low-fat    cup fresh fruit    11 ounces of Slim Fast Low Carb (only), Joy's Advantage, EAS Carb Control    Lunch/Dinner  2-3 slices roasted turkey breast  1 tbsp. of fat free mayonnaise  2 slices of  whole-wheat bread, Medium apple  10 baby carrots with 1 tbsp. of low-fat dip     cup water packed tuna or chicken  1 tablespoons of low-fat mayonnaise  1-2 tbsp. dill relish  1 serving of whole-grain crackers  1 cup of strawberries   6 inch turkey sub sandwich with light mayonnaise,   cup cottage cheese                                                                                                                                                      Black bean and low-fat cheese on a whole wheat tortilla with salsa and light sour cream  Grilled chicken sandwich  Tossed salad with light dressing    Baked potato with 3/4 cup of extra lean ground beef, light shredded cheese and salsa  Fresh fruit                                                 Chicken chunks with lettuce and vegetables stuffed in tatyana  Steamed broccoli                                                 3 oz boneless/skinless chicken breast  1/2 cup brown rice with stir-fried vegetables    grapefruit  3 ounces of salmon, trout, or tuna  1 cup of steamed asparagus  1 small slice whole grain Italian bread  Broiled white or pink fish  3/4 cup whole wheat pasta with tomatoes  3/4 cup of roasted red peppers  3 oz. of extra lean (93/7) hamburger on a Arnold's  Steele Thins  Tossed salad with light dressing       Black bean or Tuscan bean soup with grated mozzarella cheese    of a flour tortilla    3 ounces of grilled pork loin with 1 tbsp. of low-sugar barbeque sauce, 1 cup of green beans seasoned with pepper  Small dinner roll or   cup of grapefruit sections    1-2 cups of torn dario    cup of garbanzo beans or diced skinless chicken breast  5-6 cherry tomatoes  1  tbsp. of crumbled feta cheese  1 tbsp. of roasted soy nuts  1 tsp. of olive oil and 2-3 Tbsp. of balsamic or red wine vinegar  Small whole-wheat dinner roll or   cup of cut up pineapple     Quick and Easy Meals    Salad kit with rotisserie chicken, eggs, lunch meat, beans or tuna    Chicken nuggets on top of Caesar salad kit     Lunch meat sandwich or wrap with veggies (sugar snap peas, bell pepper slices, carrot sticks, celery, sliced cucumber, cherry tomatoes, etc.)    Greek or light yogurt with a handful of nuts and fruit    Cottage cheese, cherry tomatoes and Triscuit crackers    Refried bean and cheese quesadilla on whole wheat tortilla    Can of Progresso Light or [place] Well Yes soup - add additional leftover protein like chicken to boost protein    [place] cakes pancake or waffles with peanut butter or berries    Baked sweet potato with black beans and salsa and a side salad    Adult lunchable: lunch meat, string cheese, crackers and veggies    Tuna Cucumber Boats: cut cucumber in half, scoop out cucumber seeds, fill with mixture of tuna, light peguero, jaycee mustard, red onion, banana peppers, dried dill, salt and pepper    Protein pasta salad: whole wheat or bean pasta with chicken sausage, broccoli and italian dressing    Egg sandwich on english muffin: egg, slice of cheese and piece of ham    Grilled cheese and turkey sandwich with a side salad or cup of soup    BBQ Flatbread: Flat Out high protein flatbread with rotisserie chicken, BBQ sauce and red onion, topped with mozzarella cheese and baked  in the oven    [place] Chicken Wrap: Rotisserie chicken warmed up with Mejia's Hot Sauce in a medium size tortilla with light ranch dressing. Add mixed greens, red onion, diced tomato, 2% shredded cheddar cheese.    Cottage cheese Pizza Bowl - mix cottage cheese, marinara, mozzarella cheese, turkey pepperonis, italian seasoning, onions, bell peppers, heated up, serve with bell peppers and/or crackers    Cottage cheese Taco Bowl - mix cottage cheese, ground beef with taco seasoning, shredded cheese, lettuce, tomato, heated up    Greek cottage cheese bowl - mix cottage cheese, dill, vegetables (cucumber, bell pepper, cucumber, cherry tomatoes), salt and pepper, olives, feta, a little drizzle of olive oil     Whole wheat or bean pasta with pesto and chicken sausage     Sheet Pan Dinner: Chicken sausage, herbed baby potatoes, asparagus, carrots, and onions roasted in olive oil    Omelet with chicken or beans, low-fat cheese, veggies (bell pepper, tomato, spinach, mushroom, onions), and salsa    Whole wheat toast topped with mashed avocado, sliced tomato, and a fried egg     Berry Salad: Spinach/lettuce, berries, grilled chicken/salmon/tofu, low-fat feta cheese, with vinaigrette dressing     Rafael-Ean Salad: Mixed greens, chicken breast, black beans, corn, diced tomatoes, green onions, cheddar cheese, cilantro, crushed tortilla chips, and a dressing of light ranch mixed with taco seasoning    Sweet potato, bell pepper, chickpeas, onion, and tomato sauteed in light oil with spices of choice (paprika, cumin, trey, garlic, cayenne, black pepper)    Cranberry Apple Quinoa Salad: quinoa, grilled chicken, diced apple, celery, green onion, unsweetened dried cranberries, and chopped pecans, with a dressing of olive oil, jaycee mustard, and honey    Asian Cabbage Salad: Sliced green and red cabbage, sliced bell pepper, edamame, shredded carrots, cilantro, slivered almonds, and grilled chicken or tofu, with trey peanut dressing

## 2024-09-30 ENCOUNTER — MYC MEDICAL ADVICE (OUTPATIENT)
Dept: SURGERY | Facility: CLINIC | Age: 34
End: 2024-09-30
Payer: COMMERCIAL

## 2024-09-30 DIAGNOSIS — K76.0 FATTY LIVER: Primary | ICD-10-CM

## 2024-09-30 DIAGNOSIS — E66.01 MORBID OBESITY WITH BMI OF 40.0-44.9, ADULT (H): ICD-10-CM

## 2024-10-05 ENCOUNTER — HOSPITAL ENCOUNTER (OUTPATIENT)
Dept: MRI IMAGING | Facility: CLINIC | Age: 34
Discharge: HOME | End: 2024-10-05
Attending: FAMILY MEDICINE | Admitting: FAMILY MEDICINE
Payer: COMMERCIAL

## 2024-10-05 DIAGNOSIS — K76.0 HEPATIC STEATOSIS: ICD-10-CM

## 2024-10-05 DIAGNOSIS — K76.89 NODULE ON LIVER: ICD-10-CM

## 2024-10-05 PROCEDURE — 74183 MRI ABD W/O CNTR FLWD CNTR: CPT

## 2024-10-05 PROCEDURE — 255N000002 HC RX 255 OP 636: Performed by: FAMILY MEDICINE

## 2024-10-05 PROCEDURE — A9585 GADOBUTROL INJECTION: HCPCS | Performed by: FAMILY MEDICINE

## 2024-10-05 RX ORDER — GADOBUTROL 604.72 MG/ML
10 INJECTION INTRAVENOUS ONCE
Status: COMPLETED | OUTPATIENT
Start: 2024-10-05 | End: 2024-10-05

## 2024-10-05 RX ADMIN — GADOBUTROL 10 ML: 604.72 INJECTION INTRAVENOUS at 16:43

## 2024-10-06 ENCOUNTER — HEALTH MAINTENANCE LETTER (OUTPATIENT)
Age: 34
End: 2024-10-06

## 2024-11-07 ENCOUNTER — LAB (OUTPATIENT)
Dept: LAB | Facility: CLINIC | Age: 34
End: 2024-11-07
Payer: COMMERCIAL

## 2024-11-07 ENCOUNTER — OFFICE VISIT (OUTPATIENT)
Dept: SURGERY | Facility: CLINIC | Age: 34
End: 2024-11-07
Payer: COMMERCIAL

## 2024-11-07 VITALS
WEIGHT: 229.2 LBS | BODY MASS INDEX: 40.61 KG/M2 | SYSTOLIC BLOOD PRESSURE: 118 MMHG | DIASTOLIC BLOOD PRESSURE: 74 MMHG | HEIGHT: 63 IN

## 2024-11-07 DIAGNOSIS — E55.9 VITAMIN D DEFICIENCY: ICD-10-CM

## 2024-11-07 DIAGNOSIS — Z00.00 HEALTHCARE MAINTENANCE: ICD-10-CM

## 2024-11-07 DIAGNOSIS — Z00.00 HEALTHCARE MAINTENANCE: Primary | ICD-10-CM

## 2024-11-07 LAB
CHOLEST SERPL-MCNC: 211 MG/DL
FASTING STATUS PATIENT QL REPORTED: ABNORMAL
FERRITIN SERPL-MCNC: 22 NG/ML (ref 6–175)
FOLATE SERPL-MCNC: 12.6 NG/ML (ref 4.6–34.8)
HDLC SERPL-MCNC: 52 MG/DL
LDLC SERPL CALC-MCNC: 123 MG/DL
NONHDLC SERPL-MCNC: 159 MG/DL
TRIGL SERPL-MCNC: 178 MG/DL
VIT B12 SERPL-MCNC: 290 PG/ML (ref 232–1245)

## 2024-11-07 PROCEDURE — 84590 ASSAY OF VITAMIN A: CPT | Mod: 90

## 2024-11-07 PROCEDURE — 82607 VITAMIN B-12: CPT

## 2024-11-07 PROCEDURE — 99205 OFFICE O/P NEW HI 60 MIN: CPT | Performed by: NURSE PRACTITIONER

## 2024-11-07 PROCEDURE — 82746 ASSAY OF FOLIC ACID SERUM: CPT

## 2024-11-07 PROCEDURE — 82728 ASSAY OF FERRITIN: CPT

## 2024-11-07 PROCEDURE — 80061 LIPID PANEL: CPT

## 2024-11-07 PROCEDURE — 84630 ASSAY OF ZINC: CPT | Mod: 90

## 2024-11-07 PROCEDURE — 99000 SPECIMEN HANDLING OFFICE-LAB: CPT

## 2024-11-07 PROCEDURE — 84425 ASSAY OF VITAMIN B-1: CPT | Mod: 90

## 2024-11-07 PROCEDURE — 36415 COLL VENOUS BLD VENIPUNCTURE: CPT

## 2024-11-07 RX ORDER — ERGOCALCIFEROL 1.25 MG/1
50000 CAPSULE, LIQUID FILLED ORAL WEEKLY
Qty: 12 CAPSULE | Refills: 0 | Status: SHIPPED | OUTPATIENT
Start: 2024-11-07 | End: 2025-02-05

## 2024-11-07 RX ORDER — HYDROXYZINE HYDROCHLORIDE 25 MG/1
25 TABLET, FILM COATED ORAL
COMMUNITY
Start: 2024-10-16

## 2024-11-07 ASSESSMENT — SLEEP AND FATIGUE QUESTIONNAIRES
HOW LIKELY ARE YOU TO NOD OFF OR FALL ASLEEP WHILE SITTING AND READING: MODERATE CHANCE OF DOZING
HOW LIKELY ARE YOU TO NOD OFF OR FALL ASLEEP WHILE LYING DOWN TO REST IN THE AFTERNOON WHEN CIRCUMSTANCES PERMIT: HIGH CHANCE OF DOZING
HOW LIKELY ARE YOU TO NOD OFF OR FALL ASLEEP WHILE SITTING QUIETLY AFTER LUNCH WITHOUT ALCOHOL: MODERATE CHANCE OF DOZING
HOW LIKELY ARE YOU TO NOD OFF OR FALL ASLEEP WHILE WATCHING TV: MODERATE CHANCE OF DOZING
HOW LIKELY ARE YOU TO NOD OFF OR FALL ASLEEP WHILE SITTING INACTIVE IN A PUBLIC PLACE: SLIGHT CHANCE OF DOZING
HOW LIKELY ARE YOU TO NOD OFF OR FALL ASLEEP WHEN YOU ARE A PASSENGER IN A CAR FOR AN HOUR WITHOUT A BREAK: MODERATE CHANCE OF DOZING
HOW LIKELY ARE YOU TO NOD OFF OR FALL ASLEEP IN A CAR, WHILE STOPPED FOR A FEW MINUTES IN TRAFFIC: SLIGHT CHANCE OF DOZING
HOW LIKELY ARE YOU TO NOD OFF OR FALL ASLEEP WHILE SITTING AND TALKING TO SOMEONE: SLIGHT CHANCE OF DOZING

## 2024-11-07 NOTE — PROGRESS NOTES
"New Bariatric Surgery Consultation Note    2024    RE: Aelxandra Lira  MR#: [id number]  : 1990      Referring provider:       2024    11:17 AM   --   Who referred you Kathleen pitts       Chief Complaint/Reason for visit: evaluation for possible weight loss surgery    Dear Kathleen Lua PA (General),    I had the pleasure of seeing your patient, Alexandra Lira, to evaluate her obesity and consider her for possible weight loss surgery. As you know, Alexandra Lira is 34 year old.  She has a height of 5' 2.5\", a weight of 229 lbs 3.2 oz, and calculated Body mass index is 41.25 kg/m .        HISTORY OF PRESENT ILLNESS:      2024    11:17 AM   Weight Loss History Reviewed with Patient   What is the most that you have ever weighed 235   What is the most weight you have lost? 145   I have tried the following methods to lose weight Watching portions or calories    Exercise    Atkins type diet (low carb/high protein)    Physician directed program   I have tried the following weight loss medications? (Check all that apply) Contrave (Naltrexone + Buproion)       CO-MORBIDITIES OF OBESITY INCLUDE:      2024    11:17 AM   --   I have the following health issues associated with obesity Fatty Liver    Stress Incontinence       PAST MEDICAL HISTORY:  Past Medical History:   Diagnosis Date    Anxiety and depression     Autism spectrum     Fibromyalgia     Gastroesophageal reflux disease without esophagitis     Hirsutism     Insulin resistance     Irregular menses     Positive CORRIE (antinuclear antibody)     Post partum depression     Severe obesity (BMI >= 40) (H)     Skin tag    GERD is occasional and not treated daily. Noticed that it lasted for 3 hours and responded to TUMS; does have some heartburn with certain foods that responds to as needed antacids    PAST SURGICAL HISTORY:  Past Surgical History:   Procedure Laterality Date     SECTION      X2       FAMILY HISTORY:   Family " History   Problem Relation Age of Onset    Depression Mother     Anxiety Disorder Mother     Hyperlipidemia Mother     Anal fissures Mother     Peripheral Neuropathy Mother     Fibromyalgia Mother     Obesity Father     Anxiety Disorder Sister     Depression Sister     Attention Deficit Disorder Sister     Alzheimer Disease Paternal Grandmother     Lung Cancer Paternal Grandfather     Autism Spectrum Disorder Son        SOCIAL HISTORY:       11/7/2024    11:17 AM   Social History Questions Reviewed With Patient   Which best describes your employment status (select all that apply) I work full-time   If you work, what is your occupation? [employer]    Which best describes your marital status    Who do you have in your support network that can be available to help you for the first 2 weeks after surgery? Spouse, mother, father, sister   Who can you count on for support throughout your weight loss surgery journey? spouse, mother, father, sister       HABITS:      11/7/2024    11:17 AM   --   How often do you drink alcohol? Monthly or less   If you do drink alcohol, how many drinks might you have in a day? (one drink = 5 oz. wine, 1 can/bottle of beer, 1 shot liquor) 1 or 2   Do you currently use any of the following Nicotine products? No   Have you ever used any of the following nicotine products? No   Have you or are you currently using street drugs or prescription strength medication for which you do not have a prescription for? No   Do you have a history of chemical dependency (alcohol or drug abuse)? No       PSYCHOLOGICAL HISTORY:       11/7/2024    11:17 AM   Psychological History Reviewed With Patient   Have you ever attempted suicide? In the last 1 to 5 years.   Have you had thoughts of suicide in the past year? No   Have you ever been hospitalized for mental illness or a suicide attempt? In the last 1 to 5 years.   Do you have a history of chronic pain? Yes   Have you  ever been diagnosed with fibromyalgia? Yes   Are you currently being treated for any of the following? (select all that apply) Depression    Anxiety   Are you currently seeing a therapist or counselor? Yes   Are you currently seeing a psychiatrist? No       ROS:      11/7/2024    11:17 AM   --   Skin None of the above   HEENT Headaches    Dizziness/lightheadedness   Musculoskeletal Joint Pain    Back pain   Cardiovascular None of the above   Pulmonary None of the above   Gastrointestinal Heartburn   Genitourinary Stress incontinence (losing urine when coughing, sneezing, etc.)   Hematological None of the above   Neurological None of the above   Female only Regular menstrual cycles       EATING BEHAVIORS:      11/7/2024    11:17 AM   --   Have you or anyone else thought that you had an eating disorder? No   Do you currently binge eat (eat a large amount of food in a short time)? No   Are you an emotional eater? Yes   Do you get up to eat after falling asleep? Yes   Can you afford 3 meals a day? Yes   Can you afford 50-60 dollars a month for vitamins? Yes       EXERCISE:      11/7/2024    11:17 AM   --   How often do you exercise? 3 to 4 times per week   What is the duration of your exercise (in minutes)? 60+ Minutes   What types of exercise do you do? gym membership    running    weightlifting   What keeps you from being more active? Lack of Time       MEDICATIONS:  Current Outpatient Medications   Medication Sig Dispense Refill    clobetasol (TEMOVATE) 0.05 % external solution Apply to scalp daily as needed itching.      DULoxetine (CYMBALTA) 30 MG capsule       DULoxetine (CYMBALTA) 60 MG capsule Take 60 mg by mouth.      FLUoxetine (PROZAC) 40 MG capsule Take 40 mg by mouth.      hydrOXYzine HCl (ATARAX) 25 MG tablet Take 25 mg by mouth.      ketoconazole (NIZORAL) 2 % external shampoo Lather on damp scalp, leave on for 5min, then rinse with water.         ALLERGIES:  Allergies   Allergen Reactions    Erythromycin  "Hives, Itching and Unknown    Hepatitis B Vaccine Recomb (3-Antigen) Unknown    Pertussis Vaccine Unknown    Bupropion Anxiety, Confusion, Difficulty breathing, Fatigue, Hives, Itching, Other (See Comments) and Rash     Possible allergy, or the Keflex   Possible allergy, or the Keflex    Possible allergy, or the Keflex    Cephalexin Anxiety, Confusion, Fatigue, Hives, Itching, Muscle Pain (Myalgia), Other (See Comments) and Rash     Possible allergy or Wellbutrin    Possible allergy or Wellbutrin     Possible allergy or Wellbutrin    Pt tolerated IV Cefazolin/Ancef at  without issue  &        LABS/IMAGING/MEDICAL RECORDS REVIEW: ordered and reviewed    PHYSICAL EXAM:  /74 (BP Location: Right arm)   Ht 1.588 m (5' 2.5\")   Wt 104 kg (229 lb 3.2 oz)   LMP 2024 (Exact Date)   BMI 41.25 kg/m    General Appearance  No acute distress. Obesity   Alert and Oriented   Ambulatory without a device  HEENT  PERRLA, EOMI; Melampati Score II  Neck  Stout; No carotid bruits or JVD  Cardiovascular  Regular rate and rhythm  No murmur or gallop  Pulmonary  LS CTA bilaterally  Abdomen  Soft, NT/ND, No rebound, no guarding  No rashes  Extremities:  Pitting edema: Not present  Distal pulses palpable  Neurologic  Cranial nerves grossly intact; no tremors present  Psychiatric  Thought content organized  Mood appears stable  Endocrine  Carpenter Facies not present  Dorsal Thoracic Prominence not present  Skin tags present  Acanthosis nigricans present  Purple Striae not present  Dermatologic  Intertrigo not present  Hirsutism present    In summary, Alexandra Lira has fibromyalgia, metabolic syndrome, hepatic steatosis, and anxiety/depression in the setting of morbid obesity.  Body mass index is 41.25 kg/m .  This satisfies NIH criteria for bariatric surgery. Once Alexandra Lira has been cleared by our bariatric psychologist and our bariatric dietitian, completed initial lab work, attended one support group, " and satisfied insurance mandated visits if applicable, she will be scheduled with one of our bariatric for Bariatric Surgery Consultation.  Alexandra Lira understands that routine health care maintenance will need to be up to date prior to surgery. she verbalizes understanding of the process to surgery and expectations for the postoperative period including the need for lifelong lifestyle changes, vitamin supplementation, and laboratory monitoring.       Weight will need to be 230 prior to submitting for insurance approval.  Standard DVT protocol  Ursodiol for 6 months after surgery to prevent gallstone formation  Sleep Study not indicated as patient does not snore  Pap smear: patient states that it was done earlier this year  Colonoscopy: not indicated  Mammogram: not indicated    Alexandra Lira was reminded to prevent pregnancy for 18-24 months post operatively.  had vasectomy.      Noy Vazquez, CHIRAG CNP  [phone]  [facility]     Total time spent on the date of this encounter doing: chart review, review of test results, patient visit, physical exam, education, counseling, developing plan of care, and documenting = 60 minutes.

## 2024-11-07 NOTE — LETTER
"2024      Alexandra Lira  [address]  [facility] [address]      Dear Colleague,    Thank you for referring your patient, Alexandra Lira, to the [facility]. Please see a copy of my visit note below.    New Bariatric Surgery Consultation Note    2024    RE: Alexandra Lira  MR#: [id number]  : 1990      Referring provider:       2024    11:17 AM   --   Who referred you Kathleen pitts       Chief Complaint/Reason for visit: evaluation for possible weight loss surgery    Dear Kathleen Lua PA (General),    I had the pleasure of seeing your patient, Alexandra Lira, to evaluate her obesity and consider her for possible weight loss surgery. As you know, Alexandra Lira is 34 year old.  She has a height of 5' 2.5\", a weight of 229 lbs 3.2 oz, and calculated Body mass index is 41.25 kg/m .        HISTORY OF PRESENT ILLNESS:      2024    11:17 AM   Weight Loss History Reviewed with Patient   What is the most that you have ever weighed 235   What is the most weight you have lost? 145   I have tried the following methods to lose weight Watching portions or calories    Exercise    Atkins type diet (low carb/high protein)    Physician directed program   I have tried the following weight loss medications? (Check all that apply) Contrave (Naltrexone + Buproion)       CO-MORBIDITIES OF OBESITY INCLUDE:      2024    11:17 AM   --   I have the following health issues associated with obesity Fatty Liver    Stress Incontinence       PAST MEDICAL HISTORY:  Past Medical History:   Diagnosis Date     Anxiety and depression      Autism spectrum      Fibromyalgia      Gastroesophageal reflux disease without esophagitis      Hirsutism      Insulin resistance      Irregular menses      Positive CORRIE (antinuclear antibody)      Post partum depression      Severe obesity (BMI >= 40) (H)      Skin tag    GERD is occasional and not treated daily. " Noticed that it lasted for 3 hours and responded to TUMS; does have some heartburn with certain foods that responds to as needed antacids    PAST SURGICAL HISTORY:  Past Surgical History:   Procedure Laterality Date      SECTION      X2       FAMILY HISTORY:   Family History   Problem Relation Age of Onset     Depression Mother      Anxiety Disorder Mother      Hyperlipidemia Mother      Anal fissures Mother      Peripheral Neuropathy Mother      Fibromyalgia Mother      Obesity Father      Anxiety Disorder Sister      Depression Sister      Attention Deficit Disorder Sister      Alzheimer Disease Paternal Grandmother      Lung Cancer Paternal Grandfather      Autism Spectrum Disorder Son        SOCIAL HISTORY:       2024    11:17 AM   Social History Questions Reviewed With Patient   Which best describes your employment status (select all that apply) I work full-time   If you work, what is your occupation? [employer]    Which best describes your marital status    Who do you have in your support network that can be available to help you for the first 2 weeks after surgery? Spouse, mother, father, sister   Who can you count on for support throughout your weight loss surgery journey? spouse, mother, father, sister       HABITS:      2024    11:17 AM   --   How often do you drink alcohol? Monthly or less   If you do drink alcohol, how many drinks might you have in a day? (one drink = 5 oz. wine, 1 can/bottle of beer, 1 shot liquor) 1 or 2   Do you currently use any of the following Nicotine products? No   Have you ever used any of the following nicotine products? No   Have you or are you currently using street drugs or prescription strength medication for which you do not have a prescription for? No   Do you have a history of chemical dependency (alcohol or drug abuse)? No       PSYCHOLOGICAL HISTORY:       2024    11:17 AM   Psychological History Reviewed  With Patient   Have you ever attempted suicide? In the last 1 to 5 years.   Have you had thoughts of suicide in the past year? No   Have you ever been hospitalized for mental illness or a suicide attempt? In the last 1 to 5 years.   Do you have a history of chronic pain? Yes   Have you ever been diagnosed with fibromyalgia? Yes   Are you currently being treated for any of the following? (select all that apply) Depression    Anxiety   Are you currently seeing a therapist or counselor? Yes   Are you currently seeing a psychiatrist? No       ROS:      11/7/2024    11:17 AM   --   Skin None of the above   HEENT Headaches    Dizziness/lightheadedness   Musculoskeletal Joint Pain    Back pain   Cardiovascular None of the above   Pulmonary None of the above   Gastrointestinal Heartburn   Genitourinary Stress incontinence (losing urine when coughing, sneezing, etc.)   Hematological None of the above   Neurological None of the above   Female only Regular menstrual cycles       EATING BEHAVIORS:      11/7/2024    11:17 AM   --   Have you or anyone else thought that you had an eating disorder? No   Do you currently binge eat (eat a large amount of food in a short time)? No   Are you an emotional eater? Yes   Do you get up to eat after falling asleep? Yes   Can you afford 3 meals a day? Yes   Can you afford 50-60 dollars a month for vitamins? Yes       EXERCISE:      11/7/2024    11:17 AM   --   How often do you exercise? 3 to 4 times per week   What is the duration of your exercise (in minutes)? 60+ Minutes   What types of exercise do you do? gym membership    running    weightlifting   What keeps you from being more active? Lack of Time       MEDICATIONS:  Current Outpatient Medications   Medication Sig Dispense Refill     clobetasol (TEMOVATE) 0.05 % external solution Apply to scalp daily as needed itching.       DULoxetine (CYMBALTA) 30 MG capsule        DULoxetine (CYMBALTA) 60 MG capsule Take 60 mg by mouth.        "FLUoxetine (PROZAC) 40 MG capsule Take 40 mg by mouth.       hydrOXYzine HCl (ATARAX) 25 MG tablet Take 25 mg by mouth.       ketoconazole (NIZORAL) 2 % external shampoo Lather on damp scalp, leave on for 5min, then rinse with water.         ALLERGIES:  Allergies   Allergen Reactions     Erythromycin Hives, Itching and Unknown     Hepatitis B Vaccine Recomb (3-Antigen) Unknown     Pertussis Vaccine Unknown     Bupropion Anxiety, Confusion, Difficulty breathing, Fatigue, Hives, Itching, Other (See Comments) and Rash     Possible allergy, or the Keflex   Possible allergy, or the Keflex    Possible allergy, or the Keflex     Cephalexin Anxiety, Confusion, Fatigue, Hives, Itching, Muscle Pain (Myalgia), Other (See Comments) and Rash     Possible allergy or Wellbutrin    Possible allergy or Wellbutrin     Possible allergy or Wellbutrin    Pt tolerated IV Cefazolin/Ancef at  without issue  &        LABS/IMAGING/MEDICAL RECORDS REVIEW: ordered and reviewed    PHYSICAL EXAM:  /74 (BP Location: Right arm)   Ht 1.588 m (5' 2.5\")   Wt 104 kg (229 lb 3.2 oz)   LMP 2024 (Exact Date)   BMI 41.25 kg/m    General Appearance  No acute distress. Obesity   Alert and Oriented   Ambulatory without a device  HEENT  PERRLA, EOMI; Melampati Score II  Neck  Stout; No carotid bruits or JVD  Cardiovascular  Regular rate and rhythm  No murmur or gallop  Pulmonary  LS CTA bilaterally  Abdomen  Soft, NT/ND, No rebound, no guarding  No rashes  Extremities:  Pitting edema: Not present  Distal pulses palpable  Neurologic  Cranial nerves grossly intact; no tremors present  Psychiatric  Thought content organized  Mood appears stable  Endocrine  Carpenter Facies not present  Dorsal Thoracic Prominence not present  Skin tags present  Acanthosis nigricans present  Purple Striae not present  Dermatologic  Intertrigo not present  Hirsutism present    In summary, Alexandra Lira has fibromyalgia, metabolic syndrome, hepatic " steatosis, and anxiety/depression in the setting of morbid obesity.  Body mass index is 41.25 kg/m .  This satisfies NIH criteria for bariatric surgery. Once Alexandra Lira has been cleared by our bariatric psychologist and our bariatric dietitian, completed initial lab work, attended one support group, and satisfied insurance mandated visits if applicable, she will be scheduled with one of our bariatric for Bariatric Surgery Consultation.  Alexandra Lira understands that routine health care maintenance will need to be up to date prior to surgery. she verbalizes understanding of the process to surgery and expectations for the postoperative period including the need for lifelong lifestyle changes, vitamin supplementation, and laboratory monitoring.       Weight will need to be 230 prior to submitting for insurance approval.  Standard DVT protocol  Ursodiol for 6 months after surgery to prevent gallstone formation  Sleep Study not indicated as patient does not snore  Pap smear: patient states that it was done earlier this year  Colonoscopy: not indicated  Mammogram: not indicated    Alexandra Lira was reminded to prevent pregnancy for 18-24 months post operatively.  had vasectomy.      CHIRAG Gonsalez CNP  [phone]  [facility]     Total time spent on the date of this encounter doing: chart review, review of test results, patient visit, physical exam, education, counseling, developing plan of care, and documenting = 60 minutes.       Again, thank you for allowing me to participate in the care of your patient.        Sincerely,        CHIRAG Stern CNP

## 2024-11-07 NOTE — PATIENT INSTRUCTIONS
Welcome to [facility]!      We are grateful that you have chosen us to partner with you on your journey to better health!  We have included some very important information for you to read as you begin your journey towards weight loss surgery. We will discuss parts of this as you move closer to surgery but please reach out if you have any questions or concerns.      Please click on the following links and they will lead you to a printable version of each handout or copy into your browser to view/print at home:    Making Your Decision, Understanding Weight Loss Surgery  [email or web address]    A Roadmap to Your Weight Loss Surgery  [email or web address]    Honoring Choices - Your Rights  [email or web address]    Honoring Choices - [place] Health Care Directive (form that can be filled out)  [email or web address]      Insurance Coverage and Approval for Surgery  Every insurance plan is different.  Many companies approve benefits for surgery, but many have specific requirements that must be completed prior to being approved.    Verification of benefits is the patient's responsibility.  You will be responsible for any charges not covered by your insurance plan - including, but not limited to copays, deductible, out of pocket, any amount over your cap limit, etc.  Using the guideline below, please contact your insurance plan (we recommend you call the Customer Service number on the back of your card).      Once all of the requirements for surgery are complete, you will see the surgeon.  Following this visit, we will submit your information to your insurance plan for Prior Authorization.  We strongly encourage you to submit any documentation that supports your weight loss attempts to us.    Questions that are important to ask your insurance company when you call them:    Are [facility] in my network?  Is bariatric surgery  a covered benefit under my policy?  If so, what is my estimated out of pocket expense?  Are there any exclusions or cap limits on my plan for bariatric surgery?  If so, what are they?  What are the criteria necessary to be approved for bariatric surgery?  Do you require medically supervised weight loss visits for approval of surgery?  If yes, for how many months?  Within the last # of years?  Are the following procedures a covered benefit under my plan?  Laparoscopic Gastric Bypass (CPT Code 44996)  Laparoscopic Sleeve Gastrectomy (CPT Code 49288)  Nutrition/Dietitian Consult (CPT Code 92429  Nutrition/Dietitian Reassessment (CPT Code 19688  Psychological Assessment (CPT Codes 79199 & 34851      Initial labs for Bariatric Surgery    Some lab orders were placed by your doctor in the [employer] system and can be drawn at any of our outpatient hospital labs or your clinic. If you do them at one of three hospitals ([facility] in [place], [facility] in [place], [facility] in [place]) you do not need an appointment but if you'd like to do them at a clinic, you will need to schedule an appointment with that clinic.       You will need to be fasting 10-12 hours prior (you can continue to drink water) and should have them drawn sooner verses later so if any corrections need to be made we will have time to address them.      [place]'s lab is open 7 am - 4:30 pm Monday through Friday and 9am-12:30 pm on Saturdays.  [facility]'s lab is open 7 am -4:30 pm Monday through Friday and 8am to 11:30am on Saturday and Sundays.     If you would like them done at a clinic outside the [employer] system, then we will need to know where to fax the orders.   If you have any questions or would like help scheduling a lab appointment at the [facility], please give us a call on the Bariatric Nurse Line at [phone].        [place] [facility] for Bariatric Surgery  Eat 3 nutrient-rich meals each day      Don't skip meals--it will cause you to overeat later in the day! Space meals 4-6 hours apart    Eat around the same times each day to develop a routine eating schedule    Avoid snacking unless physically hungry.   Planned snacks: 1-2 times per day and no more than 150 calories    Eating protein and fiber (vegetables/fruits/whole grains) with meals helps you stay full     Choose foods with less than 10 grams of sugar and 5-10 grams of fat per serving to prevent excess calories and weight gain  Eat protein first    Protein helps with healing, maintaining muscle mass and good nutrition, and reducing hunger     For RNY Gastric Bypass, Sleeve Gastrectomy, and Duodenal Switch: aim for 60-80 grams of protein per day    Eat protein first, then veggies and the fruits or grains  Stop drinking 15-30 minutes before meals and wait 30-45 minutes after eating to drink    Drinking too soon before a meal may cause you to be too full to eat    Drinking too soon after you eat will cause the food to  wash  through your new stomach too quickly--leaving you hungry and likely to eat more    Eat S-[id number]    Take 20-30 minutes to eat each meal by taking small bites, chewing foods to applesauce consistency or 20-30 times before you swallow    Not chewing food properly can block the opening of your pouch--causing pain, nausea, vomiting    Eating foods too fast can delay those full signals and increase overeating   Slow down your eating by smaller plates/bowls, toddler utensils, putting your fork/spoon down between bites and not watching TV/computer during meals!  Make a list of activities to prevent boredom, stress and emotional eating    Go for a walk or lift weights while watching your favorite TV show    Talk to a co-worker or email/call a friend     Keep your hands and mind busy with woodworking, puzzles, knitting or painting your nails    Practice deep breathing or meditation  Drink 64 ounces of 0-Calorie drinks between meals      Water    Zero calorie Propel , Vitamin Water Zero  or So[employer] Lifewater , Crystal Light , Sugar-Free Todd-Aid , and other sugar-free lemonade or flavored murphy    Decaffeinated, unsweetened coffee/ tea (1 cup or less per day)   Avoid Caffeine and Carbonation- NO STRAWS    Caffeine can irritate your new pouch, increase risk for ulcers, and can increase your appetite      Carbonation can lead to increased bloating/gas and discomfort   Work up to a total of 30-60 minutes of physical activity most days of the week    Helps with losing weight and preventing weight regain after surgery     Do a combination of cardio (walking/water exercises/biking/swimming) and strength training  Take daily vitamin/mineral supplements after surgery    Daily use of vitamins/minerals is necessary for the rest of your life      Psychological Evaluation for Bariatric Surgery      Why do I need a psychological evaluation before bariatric surgery?     The psychologist's main purpose is to provide the support and education to ensure you have the most successful outcome after surgery.   Preparing for bariatric surgery often involves changing behavior patterns. Working on these changes before surgery allows you to achieve the best results after surgery as some of these behaviors have been entrenched for many years. In addition, your relationship with food may need to change. Psychologists are experts in behavior change and are there to guide and support you as you make these important changes.   A significant life change, like losing a lot of weight, may affect many areas of your life--self-concept, physical activity and relationships with others. Your psychologist will help you prepare to adjust to these changes in a healthy way.   If you have mental health symptoms, relationship struggles, or other challenges, your psychologist will suggest how to best address these concerns so that they do not interfere with your progress toward a healthier  lifestyle.       Is the psychologist looking for reasons to say I can't have surgery?   The goal is to not find specific psychological problems. Instead, the psychologist will be working with your strengths to ensure you have the most optimal outcome after surgery.  There is no expectation that you will have everything figured out already or that you must have  perfect  behaviors before moving forward with surgery. Your psychologist is expecting that you will have some behavior changes that will need to occur concurrent with the surgery.   You can feel comfortable that the psychologist is not there to    you or your habits. The psychologist is there to provide support and encourage your progress.      What should I expect during the evaluation?   During the interview, which could take place over 2 or more sessions, the psychologist will ask about:   -weight history, current eating pattern and fluid intake, and previous attempts at weight loss   -activity level   -psychiatric history  -drug, alcohol and nicotine use   -social and developmental history  -support system   -current stressors and coping mechanisms   -understanding of the risks of surgery   -expectations for outcomes after surgery   You will complete various questionnaires that will focus on coping strategies, eating behaviors, quality of life and adverse childhood experiences in order to provide additional information to inform the assessment.   Your psychologist will likely give you some  homework assignments  to practice as you prepare for surgery. This will be individually tailored to your specific needs.   Your psychologist will talk with you about some of the typical challenges that patients might encounter after surgery and how to prepare for these.   A final report will be generated and any treatment recommendations will be discussed with you and the bariatric team to determine next steps.   If you would like, you may have any support people  (e.g., spouse) join a session of the evaluation to provide additional information.       Bariatric surgery offers a physical  tool  for weight management. Similarly, your work with the psychologist will provide you with some additional emotional and behavioral  tools  as you work toward your healthier lifestyle goals.      Support Group    Support and accountability is an important part of your weight loss journey and maintenance once you reach your health goals.  Because of this, we require that you attend at least one of our support groups before you meet with the surgeon so you get a feel for what they are like and hopefully establish a connection to others who are going through a similar process or have had surgery before so you can ask your questions.    We currently have two options for support group but they are in the virtual format only at this time.  Both groups are using Microsoft Teams for their platform and you can access it through the web or an germaine that can be downloaded to a smart phone if you have one.      The Pre- and Post-op Connections Group is on the third Tuesday of the month from 6:30-8pm and is hosted by Cj Meehan, PhD.  If you are interested in this group, you will need to email him at [email or web address] [email or web address] each month and then he will in turn send you the invitation to join.      We also have a Post-op focused Connections Group the 4th Thursday of the month from 11am-12pm that is mostly geared toward post-operative patients who are past three months post-op.  This group is hosted by BENITO Castillo, CBN, CIC and you can email her to join the group at [email or web address] each month and she will send you an invite similar to Cj's group.  If you decide you would like to be a regular attender at this group, we can add you to an automatic invitation list of people.    You can see more information about available support groups that the [employer] [facility] offers to our patients  as well by following the link:    The following Support Group information can also be found on our website:  [email or web address]      Please let us know if you have any questions about all the information above and we will be talking more about this during future visits.     Thank you,   [facility] Bariatric Team     Bariatric Task List    Fax:  Please fax all paperwork to: [phone] -     Status:  Is patient a candidate for bariatric surgery?:  patient is a candidate for bariatric surgery -     Cleared to schedule surgeon consult?:  not cleared to schedule surgeon consult -     Status:  surgery evaluation in process -     Surgeon: Any -        Insurance: Insurance:  [employer] [place] -     Other:  Kandice reviewed and 6 months SWL needed -        Patient Info: Initial Weight:  229.2# -     Date of Initial Weight/Height:  11/7/2024 -     Required Weight Loss:  no weight gain -     Surgery Type:  undecided -        Dietician Visits: Structured weight loss required by insurance?:  structured weight loss required -     Dietician Visit 1:  Completed -     Dietician Visit 2:  Completed -     Dietician Visit 3:  Needed -     Dietician Visit 4:  Needed -     Dietician Visit 5:  Needed -     Dietician Visit 6:  Needed -     Clearance from dietician to see surgeon?:  Needed -     Dietician Notes:    -        Psychological Evaluation: Psych eval:  Needed -     Other:    -        Lab Work: Complete Blood Count:  Completed -     Comprehensive Metabolic Panel:  Completed -     Vitamin D:  Completed -     PTH:  Completed -     Hgb A1c:  Completed -      Lipids: Completed -      TSH (UCARE, [facility], [place] MA): Completed -       Ferritin: Completed -       Folate: Completed -     Thiamine: Completed -     Vitamin A: Completed -     Vitamin B12: Completed -     Zinc: Completed -     Other:    -        PCP: Establish care with PCP:  Completed -        Health Maintenance: Colonoscopy(>  "50 yrs or family hx):  NOT Needed -     Mammogram (> 40 yrs or family hx):  NOT Needed -     Pap Smear (women): Needed - need copy of recent pap smear in the last year.      Patient Education:  Information Session:  Completed - [email or web address]   Given \"Making your decision\" handout?:  Yes -     Given \"A Roadmap to you Weight Loss Surgery\" handout?: Yes -     Given \"Epic Care Companion\" information?: No -     Attended support group?:  Needed -        Additional Surgery Requirements: Review Coag plan:  Completed - standard   Gallstone prevention plan (Actigall for 6 months postop): Needed -        Final Tasks:  Before surgery online preop class:  Needed -     After surgery online class:  Needed -     History and Physical: Primary Care Provider -   Needed -     Final labs per clinic: Needed -     Chest xray per clinic: Needed -     Electrocardiogram (ECG) per clinic: Needed -     Schedule postop appointments: Needed -     Other:   -   -        Notes: Final tasks will be ordered and scheduled once surgery is scheduled     -            "

## 2024-11-08 LAB — ZINC SERPL-MCNC: 68.4 UG/DL

## 2024-11-10 LAB
ANNOTATION COMMENT IMP: NORMAL
RETINYL PALMITATE SERPL-MCNC: <0.02 MG/L
VIT A SERPL-MCNC: 0.41 MG/L

## 2024-11-11 LAB — VIT B1 PYROPHOSHATE BLD-SCNC: 157 NMOL/L

## 2024-11-27 ENCOUNTER — VIRTUAL VISIT (OUTPATIENT)
Dept: SURGERY | Facility: CLINIC | Age: 34
End: 2024-11-27
Payer: COMMERCIAL

## 2024-11-27 DIAGNOSIS — E66.01 MORBID OBESITY (H): Primary | ICD-10-CM

## 2024-11-27 NOTE — PROGRESS NOTES
Virtual Visit Details    Type of service:  Video Visit     Originating Location (pt. Location): Home    Distant Location (provider location):  Off-site  Platform used for Video Visit: Zoom (Telehealth)    Start Time: 11 AM  End Time: 11:45 AM    Alexandra would like to follow through with bariatric surgery for health reasons. She has struggled with her weight since high school and now is concerned about various comorbidities. She has a history of depression, anxiety and ASD. She is also a stress and emotional eater. She has good knowledge of surgery and good support. She will follow up and complete psychological testing. F32.9; F41.9; E66.01

## 2024-12-11 ENCOUNTER — VIRTUAL VISIT (OUTPATIENT)
Dept: SURGERY | Facility: CLINIC | Age: 34
End: 2024-12-11
Payer: COMMERCIAL

## 2024-12-11 DIAGNOSIS — E66.01 MORBID OBESITY (H): Primary | ICD-10-CM

## 2024-12-11 NOTE — PROGRESS NOTES
Virtual Visit Details    Type of service:  Video Visit     Originating Location (pt. Location): Home    Distant Location (provider location):  Off-site  Platform used for Video Visit: Zoom (Telehealth)    Start Time: 12:45 PM  End Time: 1:23 PM    Alexandra has started to make some changes in eating and lifestyle, but still needs to be more mindful about her eating. She will follow up with a list of activities and mindful eating strategies. She will also work on her middle of the night eating behaviors. F32.9; F41.9; ASD; E66.01

## 2024-12-19 ENCOUNTER — VIRTUAL VISIT (OUTPATIENT)
Dept: SURGERY | Facility: CLINIC | Age: 34
End: 2024-12-19
Payer: COMMERCIAL

## 2024-12-19 DIAGNOSIS — Z71.3 PRE-BARIATRIC SURGERY NUTRITION EVALUATION: ICD-10-CM

## 2024-12-19 DIAGNOSIS — E66.01 OBESITY, CLASS III, BMI 40-49.9 (MORBID OBESITY) (H): Primary | ICD-10-CM

## 2024-12-19 NOTE — LETTER
12/19/2024      Alexandra Lira  [address]  [place] [address]      Dear Colleague,    Thank you for referring your patient, Alexandra Lira, to the [facility] [place]. Please see a copy of my visit note below.    Alexandra Lira is a 34 year old who is being evaluated via a billable video visit.        How would you like to obtain your AVS? MyChart  If the video visit is dropped, the invitation should be resent by: Text to cell phone: [phone]  Will anyone else be joining your video visit? No        Initial Structured Weight Loss Supervised Diet Evaluation     Assessment:  Alexandra is being seen today for initial RD nutritional evaluation. Patient has been unsuccessful with non-surgical weight loss methods and is interested in bariatric surgery. Today we reviewed current eating habits and level of physical activity, and instructed on the changes that are required for successful bariatric outcomes.      Surgery of interest per pt:  undecided .    Workflow review:  Support Group: Not completed.  Psychology:In progress.  Lab work:Completed.  SWL:Yes 3/6 [employer]      Weight goal: At or below initial.    Anthropometrics:  Pt's weight is 229 lbs   Initial weight: 230 lbs  Weight change: no change   BMI: There is no height or weight on file to calculate BMI.   Ideal body weight: 51.3 kg (112 lb 15.8 oz)  Adjusted ideal body weight: 72.3 kg (159 lb 7.5 oz)    Medical History:  Patient Active Problem List   Diagnosis     Fibromyalgia     Gastroesophageal reflux disease without esophagitis     Anxiety and depression     Post partum depression          Nutrition History  Food allergies/intolerances/cultural or religous food customs: No     Diet history: Patient reports she is concerned with her recently fatty liver dx. Patient reports she has hx of increased exercises 30-60 min of cardio and weights 3x per week. Decreasing refined sugar intake (bakery goods).   - patient  reports she is aiming to eat three meals per day and snack less  - patient reports decreasing her carb intake has been her most challenging goal.  - her  travels for work on occasion - he is the main cook of the house     Vitamins/Mineral currently taking: Vit D, Milk thistle     Socioeconomic Status  Who does the grocery shopping for your household? Currently self (d/t  being gone) but usually    Where do you grocery shop?: [employer], [employer], [employer], [employer]  Utilizing food bank, food stamps, and/or meal delivery program(s)?: No   Who prepares your meals at home? Usually     Diet Recall/Time  Breakfast: sweeney and guda egg sandwich from [employer] with strawberry acie drink   Lunch: leftovers, pre packed salad from [employer]  Dinner: protein smoothie with greek yogurt, bagged salad from [employer]     Typical Snacks: apples, multi-grain rice cake, skinny pop popcorn, tatyana bread with lowfat butter     Overnight eating: Yes- is working on decreasing     Eating out: 7-10x per week     Beverages  Strawberry Acai drink from [employer] - with caffeine   2 cans diet coke   Water 20 oz     Exercise  No routine at this time    Nutrition Diagnosis (PES statement)    Morbid obesity related to excessive calorie intake as evidenced by Intake of high caloric density foods/beverages (juice, soda, alcohol) at meals and/or snacks; large portions; frequent grazing; Estimated intake that exceeds estimated daily energy intake; Binge eating patterns; Frequent excessive fast food or restaurant intake; and BMI 41.25         Intervention    Nutrition Education:   Provided general overview of diet and lifestyle modifications needed to be a deemed a safe candidate for bariatric surgery.   Educated patient on how to read a food label: choosing foods with than 10 grams fat and 10 grams sugar per serving to avoid dumping syndrome.  Dumping Syndrome: Described the mechanisms of syndrome, symptoms, and prevention tools from a dietary  perspective.   Vitamins: Educated on post-op vitamin regimen including MVI+ 18 mg Fe two times a day, calcium citrate 400-600 mg two times a day, 2980-9290 mcg sublingual B12 daily, 5000 IU vitamin D3 daily.     Food/Nutrient Delivery:  Educated patient on eating three meals, with cutting out snacking.  Bariatric Plate: Patient and I discussed the importance of including a lean protein source (20-30 grams/meal), vegetables (included at lunch and dinner), one serving (15g) of carbohydrate, and limited added fat (1 tb/day) at each meal.   Educated patient on how to complete a food journal and benefits of meal planning.   Educated patient on using a protein powder drink as a meal replacement and/or supplement after bariatric surgery.   Discussed importance of adequate hydration after surgery, with goal of at least 64 oz of fluids/day.  Addressed avoiding all carbonated, caffeinated and sweetened drinks to prepare for bariatric surgery.     Nutrition Counseling:  Mindful eating techniques: Encouraged slow meal pace, chewing foods to applesauce consistency for 20-30 minutes/meal.   Discussed  fluids 30 minutes before, during, and after meal to prevent dumping syndrome and discomfort post bariatric surgery.   Discussed pre/post operative diet progression, post op vitamin regimen, gave review of surgery process.     Instructions/Goals:     Start implementing bariatric surgery lifestyle modifications.  Limit caffeine intake   Plan to cook at home 4-6x per week - limit eating out     Handouts Provided:   [facility] Bariatric Surgery Nutrition Info      Monitor/Evaluation:  Pt. s target weight: no gain from initial visit, patient verbalized understanding.     Plan for next visit:   Follow up for visit 4          Video-Visit Details    Type of service:  Video Visit    Video Start Time (time video started): 2:01 PM    Video End Time (time video stopped): 2:41 PM    Originating Location (pt. Location):  Home      Distant Location (provider location):  Off-site    Mode of Communication:  Video Conference via [facility]    Physician has received verbal consent for a Video Visit from the patient? Yes      Linda Mcintyre RD         Again, thank you for allowing me to participate in the care of your patient.        Sincerely,        Linda Mcintyre RD

## 2024-12-19 NOTE — PATIENT INSTRUCTIONS
PREPARING FOR WEIGHT-LOSS SURGERY    Lifestyle Changes Before and After Weight Loss Surgery: Keys for Success     Diet Guidelines after Weight Loss Surgery     My Plate    Food Label: The 10-10 Rule    Supplements after Sleeve Gastrectomy, Gastric Bypass or Single Anastomosis Duodenal Switch      Meal Delivery Programs     Hello Fresh  Home   Blue Apr[employer]  Factor Meals  Green   Eat Clean         LEAN PROTEIN SOURCES    Protein Source Portion Calories Grams of Protein                           Nonfat, plain Greek yogurt    (10 grams sugar or less) 3/4 cup (6 oz)  12-17   Light Yogurt (10 grams sugar or less) 3/4 cup (6 oz)  6-8   Protein Shake 1 shake 110-180 15-30   Skim/1% Milk or lactose-free milk 1 cup ( 8 oz)  8   Plain or light, flavored soymilk 1 cup  7-8   Plain or light, hemp milk 1 cup 110 6   Fat Free or 1% Cottage Cheese 1/2 cup 90 15   Part skim ricotta cheese 1/2 cup 100 14   Part skim or reduced fat cheese slices 1/4cup, 3 dice 65-80 8     Mozzarella String Cheese 1 80 8   Canned tuna, chicken, crab or salmon  (canned in water)  1/2 cup 100 15-20   White fish (broiled, grilled, baked) 3 ounces 100 21   [place]/Tuna (broiled, grilled, baked) 3 ounces 150-180 21   Shrimp, Scallops, Lobster, Crab 3 ounces 100 21   Pork loin, Pork Tenderloin 3 ounces 150 21   Boneless, skinless chicken /turkey breast                          (broiled, grilled, baked) 3 ounces 120 21   [place], [place], [place], and Venison 3 ounces 120 21   Lean cuts of red meat and pork (sirloin,   round, tenderloin, flank, ground 93%-96%) 3 ounces 170 21   Lean or Extra Lean Ground Turkey 1/2 cup 150 20   90-95% Lean [place] Burger 1 [place]in 140-180 21   Low-fat casserole with lean meat 3/4 cup 200 17   Luncheon Meats                                                        (turkey, lean ham, roast beef, chicken) 3 ounces 100 21   Egg (boiled, poached, scrambled) 1 Egg 60 7   Egg Substitute 1/2 cup 70 10   Nuts (limit  to 1 serving per day)  3 Tbsp. 150 7   Nut [place] (peanut, almond)  Limit to 1 serving or less daily 1 Tbsp. 90 4   Soy Burger (varies) 1  10-15   Garbanzo, Black, [place] Beans/Edamame 1/2 cup 110 7-9   Refried Beans 1/2 cup 100 7   Kidney and Lima beans 1/2 cup 110 7   Tempeh 3 oz 175 18   Vegan crumbles 1/2 cup 100 14   Tofu 1/2 cup 110 14   Chili (beans and extra lean beef or turkey) 1 cup 200 23   Lentils 1/2 cup 115 9   Black Bean Soup 1 cup 175 12

## 2024-12-19 NOTE — PROGRESS NOTES
Alexandra Lira is a 34 year old who is being evaluated via a billable video visit.        How would you like to obtain your AVS? MyChart  If the video visit is dropped, the invitation should be resent by: Text to cell phone: [phone]  Will anyone else be joining your video visit? No        Initial Structured Weight Loss Supervised Diet Evaluation     Assessment:  Alexandra is being seen today for initial RD nutritional evaluation. Patient has been unsuccessful with non-surgical weight loss methods and is interested in bariatric surgery. Today we reviewed current eating habits and level of physical activity, and instructed on the changes that are required for successful bariatric outcomes.      Surgery of interest per pt:  undecided .    Workflow review:  Support Group: Not completed.  Psychology:In progress.  Lab work:Completed.  SWL:Yes 3/6 [employer]      Weight goal: At or below initial.    Anthropometrics:  Pt's weight is 229 lbs   Initial weight: 230 lbs  Weight change: no change   BMI: There is no height or weight on file to calculate BMI.   Ideal body weight: 51.3 kg (112 lb 15.8 oz)  Adjusted ideal body weight: 72.3 kg (159 lb 7.5 oz)    Medical History:  Patient Active Problem List   Diagnosis    Fibromyalgia    Gastroesophageal reflux disease without esophagitis    Anxiety and depression    Post partum depression          Nutrition History  Food allergies/intolerances/cultural or religous food customs: No     Diet history: Patient reports she is concerned with her recently fatty liver dx. Patient reports she has hx of increased exercises 30-60 min of cardio and weights 3x per week. Decreasing refined sugar intake (bakery goods).   - patient reports she is aiming to eat three meals per day and snack less  - patient reports decreasing her carb intake has been her most challenging goal.  - her  travels for work on occasion - he is the main cook of the house     Vitamins/Mineral currently taking: Vit D,  Milk thistle     Socioeconomic Status  Who does the grocery shopping for your household? Currently self (d/t  being gone) but usually    Where do you grocery shop?: [employer], [employer], [employer], [employer]  Utilizing food bank, food stamps, and/or meal delivery program(s)?: No   Who prepares your meals at home? Usually     Diet Recall/Time  Breakfast: sweeney and guda egg sandwich from [employer] with strawberry acie drink   Lunch: leftovers, pre packed salad from [employer]  Dinner: protein smoothie with greek yogurt, bagged salad from [employer]     Typical Snacks: apples, multi-grain rice cake, skinny pop popcorn, tatyana bread with lowfat butter     Overnight eating: Yes- is working on decreasing     Eating out: 7-10x per week     Beverages  Strawberry Acai drink from [employer] - with caffeine   2 cans diet coke   Water 20 oz     Exercise  No routine at this time    Nutrition Diagnosis (PES statement)    Morbid obesity related to excessive calorie intake as evidenced by Intake of high caloric density foods/beverages (juice, soda, alcohol) at meals and/or snacks; large portions; frequent grazing; Estimated intake that exceeds estimated daily energy intake; Binge eating patterns; Frequent excessive fast food or restaurant intake; and BMI 41.25         Intervention    Nutrition Education:   Provided general overview of diet and lifestyle modifications needed to be a deemed a safe candidate for bariatric surgery.   Educated patient on how to read a food label: choosing foods with than 10 grams fat and 10 grams sugar per serving to avoid dumping syndrome.  Dumping Syndrome: Described the mechanisms of syndrome, symptoms, and prevention tools from a dietary perspective.   Vitamins: Educated on post-op vitamin regimen including MVI+ 18 mg Fe two times a day, calcium citrate 400-600 mg two times a day, 7904-6472 mcg sublingual B12 daily, 5000 IU vitamin D3 daily.     Food/Nutrient Delivery:  Educated patient on eating three  meals, with cutting out snacking.  Bariatric Plate: Patient and I discussed the importance of including a lean protein source (20-30 grams/meal), vegetables (included at lunch and dinner), one serving (15g) of carbohydrate, and limited added fat (1 tb/day) at each meal.   Educated patient on how to complete a food journal and benefits of meal planning.   Educated patient on using a protein powder drink as a meal replacement and/or supplement after bariatric surgery.   Discussed importance of adequate hydration after surgery, with goal of at least 64 oz of fluids/day.  Addressed avoiding all carbonated, caffeinated and sweetened drinks to prepare for bariatric surgery.     Nutrition Counseling:  Mindful eating techniques: Encouraged slow meal pace, chewing foods to applesauce consistency for 20-30 minutes/meal.   Discussed  fluids 30 minutes before, during, and after meal to prevent dumping syndrome and discomfort post bariatric surgery.   Discussed pre/post operative diet progression, post op vitamin regimen, gave review of surgery process.     Instructions/Goals:     Start implementing bariatric surgery lifestyle modifications.  Limit caffeine intake   Plan to cook at home 4-6x per week - limit eating out     Handouts Provided:    [facility] [place] [facility] Nutrition Info      Monitor/Evaluation:  Pt. s target weight: no gain from initial visit, patient verbalized understanding.     Plan for next visit:   Follow up for visit 4          Video-Visit Details    Type of service:  Video Visit    Video Start Time (time video started): 2:01 PM    Video End Time (time video stopped): 2:41 PM    Originating Location (pt. Location): Home      Distant Location (provider location):  Off-site    Mode of Communication:  Video Conference via [facility]    Physician has received verbal consent for a Video Visit from the patient? Yes      Linda Mcintyre RD

## 2025-01-08 ENCOUNTER — VIRTUAL VISIT (OUTPATIENT)
Dept: SURGERY | Facility: CLINIC | Age: 35
End: 2025-01-08
Payer: COMMERCIAL

## 2025-01-08 ENCOUNTER — TRANSFERRED RECORDS (OUTPATIENT)
Dept: HEALTH INFORMATION MANAGEMENT | Facility: CLINIC | Age: 35
End: 2025-01-08

## 2025-01-08 DIAGNOSIS — E66.01 MORBID OBESITY (H): Primary | ICD-10-CM

## 2025-01-08 NOTE — PROGRESS NOTES
Virtual Visit Details    Type of service:  Video Visit     Originating Location (pt. Location): Home    Distant Location (provider location):  Off-site  Platform used for Video Visit: MyTable [employer]    Start Time: 11:55 AM  End Time: 12:18 PM    Alexandra has made quality changes in eating and lifestyle, and appears more mindful about her eating. She is now ready to proceed with surgery. A report was sent to the clinic. F32.9; F41.9; ASD; E66.01

## 2025-01-28 ENCOUNTER — OFFICE VISIT (OUTPATIENT)
Dept: SURGERY | Facility: CLINIC | Age: 35
End: 2025-01-28
Payer: COMMERCIAL

## 2025-01-28 VITALS
WEIGHT: 232.5 LBS | BODY MASS INDEX: 41.2 KG/M2 | HEIGHT: 63 IN | SYSTOLIC BLOOD PRESSURE: 124 MMHG | DIASTOLIC BLOOD PRESSURE: 68 MMHG

## 2025-01-28 DIAGNOSIS — E66.01 SEVERE OBESITY (BMI >= 40) (H): Primary | ICD-10-CM

## 2025-01-28 DIAGNOSIS — R79.89 LOW VITAMIN D LEVEL: ICD-10-CM

## 2025-01-28 DIAGNOSIS — E53.8 LOW SERUM VITAMIN B12: ICD-10-CM

## 2025-01-28 PROCEDURE — 99214 OFFICE O/P EST MOD 30 MIN: CPT | Performed by: FAMILY MEDICINE

## 2025-01-28 RX ORDER — PHENTERMINE HYDROCHLORIDE 37.5 MG/1
18.75-37.5 TABLET ORAL
Qty: 90 TABLET | Refills: 1 | Status: SHIPPED | OUTPATIENT
Start: 2025-01-28 | End: 2025-07-27

## 2025-01-28 RX ORDER — CHLORPHENIRAMINE/PHENYLPROPAN 8 MG-75 MG
1 CAPSULE, EXTENDED RELEASE ORAL DAILY
Qty: 90 CAPSULE | Refills: 3 | Status: SHIPPED | OUTPATIENT
Start: 2025-01-28 | End: 2026-01-28

## 2025-01-28 NOTE — LETTER
2025      Alexandra Lira  [address]  [place] [address]      Dear Colleague,    Thank you for referring your patient, Alexandra Lira, to the [facility] [place]. Please see a copy of my visit note below.    Outpatient Bariatric Medicine Progress Note-Structured Weight Loss   Indication: Medical bariatric therapy to precede bariatric surgery    Surgery:  Shakira en Y Gastric Bypass or Sleeve    Surgeon:  Dr. Rodney  Started non-surgical and not having success despite healthy changes. Plans to pursue surgery. Cleared by Dr. Meehan and this is 5/ SW requirements.     Impression     34 year old female with Body mass index is 41.85 kg/m . in the setting of morbid obesity which satisfies the NIH criteria for bariatric surgery.    Comorbidities:  Patient Active Problem List   Diagnosis     Fibromyalgia     Gastroesophageal reflux disease without esophagitis     Anxiety and depression     Post partum depression       Plan   Weight will need to be 230# prior to the 2 week pre-operative diet.  Heparin Protocol: standard  CPAP: NA  Actigall: for 6 months post op  Exercise Plan: has a fitbit. Hasn't been counting. Was doing every other day exercise in the form of step machine at the gym.   Contraception Plan: vasectomy  Agrees to take vitamins for Life: yes  Agrees to follow up for life: yes  Stress Management Plan: phone or cleaning  Medication Management:     Past Surgical History        Past Surgical History:   Procedure Laterality Date      SECTION      X2     WISDOM TOOTH EXTRACTION         Family History, Social History   Reviewed as documented. See time and date confirmation.    Interim History/Pre-op needs list    Initial Labs Complete and Reviewed: Complete. Taking D and starting B-12  Dietitian Visits Initiated/cleared: intentional  Weight Change since initial weight: stable  Psychologist visits initiated/cleared: cleared  HCM UTD: UTD. Pap was at   "and his not pulling in  Sugars Well Controlled: well controlled  Cardiac: no issuers  Pulmonary: no asthma, no smoking, no snoring  Hormone use: NA       Medications and Allergies      Current Outpatient Medications   Medication Sig Dispense Refill     cholecalciferol (EQL VITAMIN D3) 125 MCG (5000 UT) CAPS Take 1 capsule (5,000 Units) by mouth daily. 90 capsule 3     clobetasol (TEMOVATE) 0.05 % external solution Apply to scalp daily as needed itching.       cyanocobalamin (VITAMIN B-12) 1000 MCG sublingual tablet Place 1 tablet (1,000 mcg) under the tongue daily. 90 tablet 3     DULoxetine (CYMBALTA) 30 MG capsule        DULoxetine (CYMBALTA) 60 MG capsule Take 60 mg by mouth.       FLUoxetine (PROZAC) 40 MG capsule Take 40 mg by mouth.       hydrOXYzine HCl (ATARAX) 25 MG tablet Take 25 mg by mouth.       ketoconazole (NIZORAL) 2 % external shampoo Lather on damp scalp, leave on for 5min, then rinse with water.       vitamin D2 (ERGOCALCIFEROL) 25114 units (1250 mcg) capsule Take 1 capsule (50,000 Units) by mouth once a week. 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 Did an intensive DBT program and an inpatient program in the past    Dietary History   Meals Per Day: 3  Eating Protein First: yes  Grams of Protein daily: 60-using protein powder  Typical Snack: apple  Water Intake: intentional  Drinking Separate from Meals: yes  Calorie Containing Beverages: no  Portion Control: stable  Chewing Food to Applesauce Consistency: yes  Able to list protein foods: " "yes  Choosing Whole Grains: no  Meals at Restaurant/Cafeteria/Take Out Per Week: 1-2  Eating at the Table:   TV is Off During Meals:     Positive Changes Since Last Visit: multiple-see above  Struggling With: stress    Knowledgeable in Reading Food Labels: yes    Review of Systems     GENERAL/CONSTITUTIONAL:  Fatigue: yes  HEENT:  Dental Pain: no  Trouble Swallowing: no  CARDIOVASCULAR:  Chest pain with exertion: no  PULMONARY:  CPAP Use: NA  Asthma Controlled: NA  GASTROINTESTINAL:  GERD/Heartburn: not really, occ depending on food  Gallbladder present  UROLOGIC:  Urinary Symptoms: no  NEUROLOGIC:  Headaches: yes  Paresthesias:   PSYCHIATRIC:  Moods: euthymic  MUSCULOSKELETAL/RHEUMATOLOGIC  Arthralgias: yes  Myalgias: yes  ENDOCRINE:  Monitoring Blood Sugars: no  Sugars Well Controlled: yes  DERMATOLOGIC:  Rashes: no  Physical Exam   Vitals: /68 (BP Location: Right arm)   Ht 1.588 m (5' 2.5\")   Wt 105.5 kg (232 lb 8 oz)   BMI 41.85 kg/m    Body mass index is 41.85 kg/m .  Neck Circumference: 13\"  Waist Circumference: 39\"  GENERAL: appears her stated age. Ambulatory.  HEENT: PEERLA, EOMI, teeth adequate, airway 1+   NECK: no carotid bruits, no anterior/supraclavicular lymphadenopathy, thyroid normal   HEART: Rhythm regular, rate regular, no murmur   LUNGS: Clear   ABDOMEN: soft, non-tender, obese,no rashes,   MUSCULOSKELETAL: no obvious deformities  VASCULAR: palpable peripheral pulses, no  lower extremity edema, no color changes of venous stasis, no ulcerations  SKIN: Rashes: no    Counseling     We discussed the National Weight Control Registry and Healthy Weight Maintenance Strategies.   We discussed ways to optimize her metabolism.  We discussed specific exercise goals and dietary habits conducive to a healthy weight.  We discussed the importance of lifelong vitamin supplementation and the potential medical complications of vitamin non-compliance.  We discussed the importance of restorative sleep and " stress management in maintaining a healthy weight.  I reminded her to avoid alcohol for 1 year post-operatively, to avoid NSAIDS for life unless specifically indicated and used with a concomitant PPI, to avoid caffeine in excess, and explained the importance of lifelong tobacco abstinence.  Total time spent on the date of this encounter doing: chart review, review of test results, patient visit, physical exam, education, counseling, developing plan of care, and documenting = 30 minutes.          Again, thank you for allowing me to participate in the care of your patient.        Sincerely,        Vidya Abreu MD    Electronically signed

## 2025-01-28 NOTE — PROGRESS NOTES
Outpatient Bariatric Medicine Progress Note-Structured Weight Loss   Indication: Medical bariatric therapy to precede bariatric surgery    Surgery:  Shakira en Y Gastric Bypass or Sleeve    Surgeon:  Dr. Rodney  Started non-surgical and not having success despite healthy changes. Plans to pursue surgery. Cleared by Dr. Meehan and this is 5/6 [facility] requirements.     Impression     34 year old female with Body mass index is 41.85 kg/m . in the setting of morbid obesity which satisfies the NIH criteria for bariatric surgery.    Comorbidities:  Patient Active Problem List   Diagnosis    Fibromyalgia    Gastroesophageal reflux disease without esophagitis    Anxiety and depression    Post partum depression       Plan   Weight will need to be 230# prior to the 2 week pre-operative diet.  Heparin Protocol: standard  CPAP: NA  Actigall: for 6 months post op  Exercise Plan: has a fitbit. Hasn't been counting. Was doing every other day exercise in the form of step machine at the gym.   Contraception Plan: vasectomy  Agrees to take vitamins for Life: yes  Agrees to follow up for life: yes  Stress Management Plan: phone or cleaning  Medication Management:     Past Surgical History        Past Surgical History:   Procedure Laterality Date     SECTION      X2    WISDOM TOOTH EXTRACTION         Family History, Social History   Reviewed as documented. See time and date confirmation.    Interim History/Pre-op needs list    Initial Labs Complete and Reviewed: Complete. Taking D and starting B-12  Dietitian Visits Initiated/cleared: intentional  Weight Change since initial weight: stable  Psychologist visits initiated/cleared: cleared  HCM UTD: UTD. Pap was at  and his not pulling in  Sugars Well Controlled: well controlled  Cardiac: no issuers  Pulmonary: no asthma, no smoking, no snoring  Hormone use: NA       Medications and Allergies      Current Outpatient Medications   Medication Sig Dispense Refill    cholecalciferol (EQL  "VITAMIN D3) 125 MCG (5000 UT) CAPS Take 1 capsule (5,000 Units) by mouth daily. 90 capsule 3    clobetasol (TEMOVATE) 0.05 % external solution Apply to scalp daily as needed itching.      cyanocobalamin (VITAMIN B-12) 1000 MCG sublingual tablet Place 1 tablet (1,000 mcg) under the tongue daily. 90 tablet 3    DULoxetine (CYMBALTA) 30 MG capsule       DULoxetine (CYMBALTA) 60 MG capsule Take 60 mg by mouth.      FLUoxetine (PROZAC) 40 MG capsule Take 40 mg by mouth.      hydrOXYzine HCl (ATARAX) 25 MG tablet Take 25 mg by mouth.      ketoconazole (NIZORAL) 2 % external shampoo Lather on damp scalp, leave on for 5min, then rinse with water.      vitamin D2 (ERGOCALCIFEROL) 01961 units (1250 mcg) capsule Take 1 capsule (50,000 Units) by mouth once a week. 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 Did an intensive DBT program and an inpatient program in the past    Dietary History   Meals Per Day: 3  Eating Protein First: yes  Grams of Protein daily: 60-using protein powder  Typical Snack: apple  Water Intake: intentional  Drinking Separate from Meals: yes  Calorie Containing Beverages: no  Portion Control: stable  Chewing Food to Applesauce Consistency: yes  Able to list protein foods: yes  Choosing Whole Grains: no  Meals at Restaurant/Cafeteria/Take Out Per Week: 1-2  Eating at the Table:   TV is Off During Meals:     Positive Changes Since Last Visit: multiple-see above  Struggling With: stress    Knowledgeable in Reading Food Labels: yes    Review of Systems " "    GENERAL/CONSTITUTIONAL:  Fatigue: yes  HEENT:  Dental Pain: no  Trouble Swallowing: no  CARDIOVASCULAR:  Chest pain with exertion: no  PULMONARY:  CPAP Use: NA  Asthma Controlled: NA  GASTROINTESTINAL:  GERD/Heartburn: not really, occ depending on food  Gallbladder present  UROLOGIC:  Urinary Symptoms: no  NEUROLOGIC:  Headaches: yes  Paresthesias:   PSYCHIATRIC:  Moods: euthymic  MUSCULOSKELETAL/RHEUMATOLOGIC  Arthralgias: yes  Myalgias: yes  ENDOCRINE:  Monitoring Blood Sugars: no  Sugars Well Controlled: yes  DERMATOLOGIC:  Rashes: no  Physical Exam   Vitals: /68 (BP Location: Right arm)   Ht 1.588 m (5' 2.5\")   Wt 105.5 kg (232 lb 8 oz)   BMI 41.85 kg/m    Body mass index is 41.85 kg/m .  Neck Circumference: 13\"  Waist Circumference: 39\"  GENERAL: appears her stated age. Ambulatory.  HEENT: PEERLA, EOMI, teeth adequate, airway 1+   NECK: no carotid bruits, no anterior/supraclavicular lymphadenopathy, thyroid normal   HEART: Rhythm regular, rate regular, no murmur   LUNGS: Clear   ABDOMEN: soft, non-tender, obese,no rashes,   MUSCULOSKELETAL: no obvious deformities  VASCULAR: palpable peripheral pulses, no  lower extremity edema, no color changes of venous stasis, no ulcerations  SKIN: Rashes: no    Counseling     We discussed the National Weight Control Registry and Healthy Weight Maintenance Strategies.   We discussed ways to optimize her metabolism.  We discussed specific exercise goals and dietary habits conducive to a healthy weight.  We discussed the importance of lifelong vitamin supplementation and the potential medical complications of vitamin non-compliance.  We discussed the importance of restorative sleep and stress management in maintaining a healthy weight.  I reminded her to avoid alcohol for 1 year post-operatively, to avoid NSAIDS for life unless specifically indicated and used with a concomitant PPI, to avoid caffeine in excess, and explained the importance of lifelong tobacco " abstinence.  Total time spent on the date of this encounter doing: chart review, review of test results, patient visit, physical exam, education, counseling, developing plan of care, and documenting = 30 minutes.

## 2025-02-06 ENCOUNTER — VIRTUAL VISIT (OUTPATIENT)
Dept: SURGERY | Facility: CLINIC | Age: 35
End: 2025-02-06
Payer: COMMERCIAL

## 2025-02-06 DIAGNOSIS — Z71.3 PRE-BARIATRIC SURGERY NUTRITION EVALUATION: ICD-10-CM

## 2025-02-06 DIAGNOSIS — E66.01 OBESITY, CLASS III, BMI 40-49.9 (MORBID OBESITY) (H): Primary | ICD-10-CM

## 2025-02-06 NOTE — PROGRESS NOTES
Alexandra Lira is a 34 year old who is being evaluated via a billable video visit.      How would you like to obtain your AVS? MyChart  If the video visit is dropped, the invitation should be resent by: Text to cell phone: [phone]  Will anyone else be joining your video visit? No         Follow Up Surgical Weight Loss Supervised Diet Evaluation    Assessment:  Pt. is being seen today for a follow up RD nutritional evaluation. Pt. has been unsuccessful with non-surgical weight loss methods and is interested in bariatric surgery. Today we reviewed current eating habits and level of physical activity, and instructed on the changes that are required for successful bariatric outcomes.    Surgery of interest per pt:  undecided .    Workflow review:  Support Group: Not completed.  Psychology:Completed.  Lab work:Completed.  SWL:Yes 4/6      Weight goal: At or below initial.    Anthropometrics:  Pt's weight is 230 lbs  Initial weight: 230 lbs  Weight change: none  BMI: There is no height or weight on file to calculate BMI.   Ideal body weight: 51.3 kg (112 lb 15.8 oz)  Adjusted ideal body weight: 72.9 kg (160 lb 12.7 oz)    Medical History:  Patient Active Problem List   Diagnosis    Fibromyalgia    Gastroesophageal reflux disease without esophagitis    Anxiety and depression    Post partum depression          Progress over past month: Patient reports she is doing well nutritionally, noted she has been getting three meals per day and is limiting/eliminating late night snacking. Reports it has been difficult to reduce the caffeine intake at this time d/t her  being gone and she is getting her son ready for . Is taking her Phentermine with dinner - this could be interfering with her quality of sleep.   - continues to stop at [employer] multiple times per week   - needs to continue practicing balanced meals with protein, vegetable/fruit and carbohydrate    Diet Recall/Time  Breakfast: eggs or egg  sandwich protein smoothie (protein powder, [employer] zero greek yogurt with berries, bananas, almond milk)  Lunch: protein smoothie or pre made salad with chicken or sandwich on white bread   Dinner: Shrimp and scallops with noodles     Typical Snacks: grapes, apples, stone tatyana bread bites     Overnight eating: No - this is been eliminated     Eating out: 7-10x per week     Meal duration: 30 minutes.      fluids by 30 minutes before, during meal, and waiting 30 minutes after meal before drinking fluids: No    Beverages  Strawberry Acai drink from [employer] - with caffeine   0-1 can of diet coke per day   Water 20 oz     Exercise  No routine at this boris,e     PES statement:   Morbid obesity related to excessive energy intake as evidenced by Intake of high caloric density foods/beverages (juice, soda, alcohol) at meals and/or snacks; large portions; frequent grazing; Estimated intake that exceeds estimated daily energy intake; Binge eating patterns; Frequent excessive fast food or restaurant intake; and BMI 41.8       Intervention    Nutrition Education:   Provided general overview of diet and lifestyle modifications needed to be a deemed a safe candidate for bariatric surgery.   Educated patient on how to read a food label: choosing foods with than 10 grams fat and 10 grams sugar per serving to avoid dumping syndrome.  Dumping Syndrome: Described the mechanisms of syndrome, symptoms, and prevention tools from a dietary perspective.   Vitamins: Educated on post-op vitamin regimen including MVI+ 18 mg Fe two times a day, calcium citrate 400-600 mg two times a day, 3299-9387 mcg sublingual B12 daily, 5000 IU vitamin D3 daily.     Food/Nutrient Delivery:  Educated patient on eating three meals, with cutting out snacking.  Bariatric Plate: Patient and I discussed the importance of including a lean protein source (20-30 grams/meal), vegetables (included at lunch and dinner), one serving (15g) of carbohydrate, and  limited added fat (1 tb/day) at each meal.   Educated patient on how to complete a food journal and benefits of meal planning.   Educated patient on using a protein powder drink as a meal replacement and/or supplement after bariatric surgery.   Discussed importance of adequate hydration after surgery, with goal of at least 64 oz of fluids/day.  Addressed avoiding all carbonated, caffeinated and sweetened drinks to prepare for bariatric surgery.     Nutrition Counseling:  Mindful eating techniques: Encouraged slow meal pace, chewing foods to applesauce consistency for 20-30 minutes/meal.   Discussed  fluids 30 minutes before, during, and after meal to prevent dumping syndrome and discomfort post bariatric surgery.   Discussed pre/post operative diet progression, post op vitamin regimen, gave review of surgery process.     Instructions/Goals:     Continue implementing bariatric surgery lifestyle modifications.  Patient set goal of not getting [employer] on the weekends     Handouts Provided:   none    Monitor/Evaluation:  Pt. s target weight:  no gain from initial visit, pt. verbalized understanding.         Plan for next visit:   Follow up visit 5      Video-Visit Details    Type of service:  Video Visit    Video Start Time (time video started): 8:02 AM    Video End Time (time video stopped): 8:21 AM    Originating Location (pt. Location): Home      Distant Location (provider location):  Off-site    Mode of Communication:  Video Conference via [employer]    Physician has received verbal consent for a Video Visit from the patient? Yes      Linda Mcintyre RD

## 2025-02-06 NOTE — LETTER
2/6/2025      Alexandra Lira  [address]  [place] [address]      Dear Colleague,    Thank you for referring your patient, Alexandra Lira, to the [facility] [place]. Please see a copy of my visit note below.    Alexandra Lira is a 34 year old who is being evaluated via a billable video visit.      How would you like to obtain your AVS? MyChart  If the video visit is dropped, the invitation should be resent by: Text to cell phone: [phone]  Will anyone else be joining your video visit? No         Follow Up Surgical Weight Loss Supervised Diet Evaluation    Assessment:  Pt. is being seen today for a follow up RD nutritional evaluation. Pt. has been unsuccessful with non-surgical weight loss methods and is interested in bariatric surgery. Today we reviewed current eating habits and level of physical activity, and instructed on the changes that are required for successful bariatric outcomes.    Surgery of interest per pt:  undecided .    Workflow review:  Support Group: Not completed.  Psychology:Completed.  Lab work:Completed.  SWL:Yes 4/6      Weight goal: At or below initial.    Anthropometrics:  Pt's weight is 230 lbs  Initial weight: 230 lbs  Weight change: none  BMI: There is no height or weight on file to calculate BMI.   Ideal body weight: 51.3 kg (112 lb 15.8 oz)  Adjusted ideal body weight: 72.9 kg (160 lb 12.7 oz)    Medical History:  Patient Active Problem List   Diagnosis     Fibromyalgia     Gastroesophageal reflux disease without esophagitis     Anxiety and depression     Post partum depression          Progress over past month: Patient reports she is doing well nutritionally, noted she has been getting three meals per day and is limiting/eliminating late night snacking. Reports it has been difficult to reduce the caffeine intake at this time d/t her  being gone and she is getting her son ready for . Is taking her Phentermine  with dinner - this could be interfering with her quality of sleep.   - continues to stop at [facility] multiple times per week   - needs to continue practicing balanced meals with protein, vegetable/fruit and carbohydrate    Diet Recall/Time  Breakfast: eggs or egg sandwich protein smoothie (protein powder, [employer] zero greek yogurt with berries, bananas, almond milk)  Lunch: protein smoothie or pre made salad with chicken or sandwich on white bread   Dinner: Shrimp and scallops with noodles     Typical Snacks: grapes, apples, stone tatyana bread bites     Overnight eating: No - this is been eliminated     Eating out: 7-10x per week     Meal duration: 30 minutes.      fluids by 30 minutes before, during meal, and waiting 30 minutes after meal before drinking fluids: No    Beverages  Strawberry Acai drink from [facility] - with caffeine   0-1 can of diet coke per day   Water 20 oz     Exercise  No routine at this boris,e     PES statement:   Morbid obesity related to excessive energy intake as evidenced by Intake of high caloric density foods/beverages (juice, soda, alcohol) at meals and/or snacks; large portions; frequent grazing; Estimated intake that exceeds estimated daily energy intake; Binge eating patterns; Frequent excessive fast food or restaurant intake; and BMI 41.8       Intervention    Nutrition Education:   Provided general overview of diet and lifestyle modifications needed to be a deemed a safe candidate for bariatric surgery.   Educated patient on how to read a food label: choosing foods with than 10 grams fat and 10 grams sugar per serving to avoid dumping syndrome.  Dumping Syndrome: Described the mechanisms of syndrome, symptoms, and prevention tools from a dietary perspective.   Vitamins: Educated on post-op vitamin regimen including MVI+ 18 mg Fe two times a day, calcium citrate 400-600 mg two times a day, 3822-1228 mcg sublingual B12 daily, 5000 IU vitamin D3 daily.     Food/Nutrient  Delivery:  Educated patient on eating three meals, with cutting out snacking.  Bariatric Plate: Patient and I discussed the importance of including a lean protein source (20-30 grams/meal), vegetables (included at lunch and dinner), one serving (15g) of carbohydrate, and limited added fat (1 tb/day) at each meal.   Educated patient on how to complete a food journal and benefits of meal planning.   Educated patient on using a protein powder drink as a meal replacement and/or supplement after bariatric surgery.   Discussed importance of adequate hydration after surgery, with goal of at least 64 oz of fluids/day.  Addressed avoiding all carbonated, caffeinated and sweetened drinks to prepare for bariatric surgery.     Nutrition Counseling:  Mindful eating techniques: Encouraged slow meal pace, chewing foods to applesauce consistency for 20-30 minutes/meal.   Discussed  fluids 30 minutes before, during, and after meal to prevent dumping syndrome and discomfort post bariatric surgery.   Discussed pre/post operative diet progression, post op vitamin regimen, gave review of surgery process.     Instructions/Goals:     Continue implementing bariatric surgery lifestyle modifications.  Patient set goal of not getting [employer] on the weekends     Handouts Provided:   none    Monitor/Evaluation:  Pt. s target weight:  no gain from initial visit, pt. verbalized understanding.         Plan for next visit:   Follow up visit 5      Video-Visit Details    Type of service:  Video Visit    Video Start Time (time video started): 8:02 AM    Video End Time (time video stopped): 8:21 AM    Originating Location (pt. Location): Home      Distant Location (provider location):  Off-site    Mode of Communication:  Video Conference via [facility]    Physician has received verbal consent for a Video Visit from the patient? Yes      Linda Mcintyre RD             Again, thank you for allowing me to participate in the care of your  patient.        Sincerely,        Linda Mcintyre RD    Electronically signed

## 2025-03-20 ENCOUNTER — VIRTUAL VISIT (OUTPATIENT)
Dept: SURGERY | Facility: CLINIC | Age: 35
End: 2025-03-20
Payer: COMMERCIAL

## 2025-03-20 DIAGNOSIS — E66.01 OBESITY, CLASS III, BMI 40-49.9 (MORBID OBESITY) (H): Primary | ICD-10-CM

## 2025-03-20 DIAGNOSIS — Z71.3 PRE-BARIATRIC SURGERY NUTRITION EVALUATION: ICD-10-CM

## 2025-03-20 NOTE — LETTER
3/20/2025      Alexandra Lira  [address]  [place] [address]      Dear Colleague,    Thank you for referring your patient, Alexandra Lira, to the [facility] [place]. Please see a copy of my visit note below.    Alexandra Lira is a 34 year old who is being evaluated via a billable video visit.      How would you like to obtain your AVS? MyChart  If the video visit is dropped, the invitation should be resent by: Text to cell phone: [phone]  Will anyone else be joining your video visit? No         Follow Up Surgical Weight Loss Supervised Diet Evaluation    Assessment:  Pt. is being seen today for a follow up RD nutritional evaluation. Pt. has been unsuccessful with non-surgical weight loss methods and is interested in bariatric surgery. Today we reviewed current eating habits and level of physical activity, and instructed on the changes that are required for successful bariatric outcomes.    Surgery of interest per pt:  undecided .  Weight loss Medication: Phentermine     Workflow review:  Support Group: Not completed.  Psychology:Completed.  Lab work:Completed.  SWL:Yes 5/6      Weight goal: At or below initial.    Anthropometrics:  Pt's weight is 232 lbs  Initial weight: 230 lbs  Weight change: maintenance   BMI: There is no height or weight on file to calculate BMI.   Ideal body weight: 51.3 kg (112 lb 15.8 oz)  Adjusted ideal body weight: 72.9 kg (160 lb 12.7 oz)    Medical History:  Patient Active Problem List   Diagnosis     Fibromyalgia     Gastroesophageal reflux disease without esophagitis     Anxiety and depression     Post partum depression          Progress over past month: Patient reports she feels she is doing OK. Reports she is doing much better in regards to mindful eating habits, eating her protein first. Reports her sleep has significantly improved these last few weeks. Switched taking her phentermine in the morning vs at night. Has  stopped getting [employer] on the weekends.  - continues to consume caffeine daily.  - needs to continue practicing balanced meals with protein, vegetable/fruit and carbohydrate        Diet Recall/Time  Breakfast: eggs or egg sandwich ([employer])  Lunch: protein smoothie with added fruit, greek yogurt and protein powder or pre made salad with chicken or sandwich on white bread   Dinner: Shrimp and scallops with noodles (bought noodles and co)     Typical Snacks: grapes, apples, stone tatyana bread bites     Overnight eating: No - this is been eliminated     Eating out: 5x per week at [employer] (food and drink)  1-2x per week fast food (quick meal)     Meal duration: 30 minutes.      fluids by 30 minutes before, during meal, and waiting 30 minutes after meal before drinking fluids: No - this is something she continues to work on     Beverages  Strawberry Acai drink from [employer] - with caffeine every day of the week   0-1 can of diet coke per day   Water 48 oz   Gatorade Zero    Exercise  No routine at this time     PES statement:   Morbid obesity related to excessive energy intake as evidenced by Intake of high caloric density foods/beverages (juice, soda, alcohol) at meals and/or snacks; large portions; frequent grazing; Estimated intake that exceeds estimated daily energy intake; Binge eating patterns; Frequent excessive fast food or restaurant intake; and BMI 41.8           Food/Nutrient Delivery:  Educated patient on eating three meals, with cutting out snacking.  Bariatric Plate: Patient and I discussed the importance of including a lean protein source (20-30 grams/meal), vegetables (included at lunch and dinner), one serving (15g) of carbohydrate, and limited added fat (1 tb/day) at each meal.   Educated patient on using a protein powder drink as a meal replacement and/or supplement after bariatric surgery.   Discussed importance of adequate hydration after surgery, with goal of at least 64 oz of  fluids/day.  Addressed avoiding all carbonated, caffeinated and sweetened drinks to prepare for bariatric surgery.     Nutrition Counseling:  Mindful eating techniques: Encouraged slow meal pace, chewing foods to applesauce consistency for 20-30 minutes/meal.   Discussed  fluids 30 minutes before, during, and after meal to prevent dumping syndrome and discomfort post bariatric surgery.       Instructions/Goals:     Continue implementing bariatric surgery lifestyle modifications.  Continue limiting caffeine intake   Limit fast food to 2-3x per week max at this time   Incorporate vegetables with dinner consistently    Handouts Provided:   none    Monitor/Evaluation:  Pt. s target weight:  no gain from initial visit, pt. verbalized understanding.         Plan for next visit:   Follow up on goals set       Video-Visit Details    Type of service:  Video Visit    Video Start Time (time video started): 8:33 AM    Video End Time (time video stopped): 8:57 AM    Originating Location (pt. Location): Home      Distant Location (provider location):  Off-site    Mode of Communication:  Video Conference via [facility]    Physician has received verbal consent for a Video Visit from the patient? Yes      Linda Mcintyre RD             Again, thank you for allowing me to participate in the care of your patient.        Sincerely,        Linda Mcintyre RD    Electronically signed

## 2025-03-20 NOTE — PROGRESS NOTES
Alexandra Lira is a 34 year old who is being evaluated via a billable video visit.      How would you like to obtain your AVS? MyChart  If the video visit is dropped, the invitation should be resent by: Text to cell phone: [phone]  Will anyone else be joining your video visit? No         Follow Up Surgical Weight Loss Supervised Diet Evaluation    Assessment:  Pt. is being seen today for a follow up RD nutritional evaluation. Pt. has been unsuccessful with non-surgical weight loss methods and is interested in bariatric surgery. Today we reviewed current eating habits and level of physical activity, and instructed on the changes that are required for successful bariatric outcomes.    Surgery of interest per pt:  undecided .  Weight loss Medication: Phentermine     Workflow review:  Support Group: Not completed.  Psychology:Completed.  Lab work:Completed.  SWL:Yes 5/6      Weight goal: At or below initial.    Anthropometrics:  Pt's weight is 232 lbs  Initial weight: 230 lbs  Weight change: maintenance   BMI: There is no height or weight on file to calculate BMI.   Ideal body weight: 51.3 kg (112 lb 15.8 oz)  Adjusted ideal body weight: 72.9 kg (160 lb 12.7 oz)    Medical History:  Patient Active Problem List   Diagnosis    Fibromyalgia    Gastroesophageal reflux disease without esophagitis    Anxiety and depression    Post partum depression          Progress over past month: Patient reports she feels she is doing OK. Reports she is doing much better in regards to mindful eating habits, eating her protein first. Reports her sleep has significantly improved these last few weeks. Switched taking her phentermine in the morning vs at night. Has stopped getting [employer] on the weekends.  - continues to consume caffeine daily.  - needs to continue practicing balanced meals with protein, vegetable/fruit and carbohydrate        Diet Recall/Time  Breakfast: eggs or egg sandwich ([employer])  Lunch: protein smoothie with  added fruit, greek yogurt and protein powder or pre made salad with chicken or sandwich on white bread   Dinner: Shrimp and scallops with noodles (bought noodles and [employer])     Typical Snacks: grapes, apples, stone tatyana bread bites     Overnight eating: No - this is been eliminated     Eating out: 5x per week at [employer] (food and drink)  1-2x per week fast food (quick meal)     Meal duration: 30 minutes.      fluids by 30 minutes before, during meal, and waiting 30 minutes after meal before drinking fluids: No - this is something she continues to work on     Beverages  Strawberry Acai drink from [employer] - with caffeine every day of the week   0-1 can of diet coke per day   Water 48 oz   Gatorade Zero    Exercise  No routine at this time     PES statement:   Morbid obesity related to excessive energy intake as evidenced by Intake of high caloric density foods/beverages (juice, soda, alcohol) at meals and/or snacks; large portions; frequent grazing; Estimated intake that exceeds estimated daily energy intake; Binge eating patterns; Frequent excessive fast food or restaurant intake; and BMI 41.8           Food/Nutrient Delivery:  Educated patient on eating three meals, with cutting out snacking.  Bariatric Plate: Patient and I discussed the importance of including a lean protein source (20-30 grams/meal), vegetables (included at lunch and dinner), one serving (15g) of carbohydrate, and limited added fat (1 tb/day) at each meal.   Educated patient on using a protein powder drink as a meal replacement and/or supplement after bariatric surgery.   Discussed importance of adequate hydration after surgery, with goal of at least 64 oz of fluids/day.  Addressed avoiding all carbonated, caffeinated and sweetened drinks to prepare for bariatric surgery.     Nutrition Counseling:  Mindful eating techniques: Encouraged slow meal pace, chewing foods to applesauce consistency for 20-30 minutes/meal.   Discussed   fluids 30 minutes before, during, and after meal to prevent dumping syndrome and discomfort post bariatric surgery.       Instructions/Goals:     Continue implementing bariatric surgery lifestyle modifications.  Continue limiting caffeine intake   Limit fast food to 2-3x per week max at this time   Incorporate vegetables with dinner consistently    Handouts Provided:   none    Monitor/Evaluation:  Pt. s target weight:  no gain from initial visit, pt. verbalized understanding.         Plan for next visit:   Follow up on goals set       Video-Visit Details    Type of service:  Video Visit    Video Start Time (time video started): 8:33 AM    Video End Time (time video stopped): 8:57 AM    Originating Location (pt. Location): Home      Distant Location (provider location):  Off-site    Mode of Communication:  Video Conference via [facility]    Physician has received verbal consent for a Video Visit from the patient? Yes      Linda Mcintyre RD

## 2025-05-21 ENCOUNTER — OFFICE VISIT (OUTPATIENT)
Dept: URGENT CARE | Facility: URGENT CARE | Age: 35
End: 2025-05-21
Payer: COMMERCIAL

## 2025-05-21 VITALS
RESPIRATION RATE: 18 BRPM | DIASTOLIC BLOOD PRESSURE: 84 MMHG | HEIGHT: 63 IN | OXYGEN SATURATION: 98 % | WEIGHT: 238 LBS | TEMPERATURE: 98 F | BODY MASS INDEX: 42.17 KG/M2 | SYSTOLIC BLOOD PRESSURE: 130 MMHG | HEART RATE: 94 BPM

## 2025-05-21 DIAGNOSIS — B37.31 YEAST INFECTION OF THE VAGINA: Primary | ICD-10-CM

## 2025-05-21 LAB
ALBUMIN UR-MCNC: NEGATIVE MG/DL
APPEARANCE UR: CLEAR
BILIRUB UR QL STRIP: NEGATIVE
CLUE CELLS: ABNORMAL
COLOR UR AUTO: YELLOW
GLUCOSE UR STRIP-MCNC: NEGATIVE MG/DL
HGB UR QL STRIP: NEGATIVE
KETONES UR STRIP-MCNC: NEGATIVE MG/DL
LEUKOCYTE ESTERASE UR QL STRIP: ABNORMAL
NITRATE UR QL: NEGATIVE
PH UR STRIP: 6 [PH] (ref 5–7)
RBC #/AREA URNS AUTO: ABNORMAL /HPF
SP GR UR STRIP: 1.01 (ref 1–1.03)
SQUAMOUS #/AREA URNS AUTO: ABNORMAL /LPF
TRICHOMONAS, WET PREP: ABNORMAL
UROBILINOGEN UR STRIP-ACNC: 0.2 E.U./DL
WBC #/AREA URNS AUTO: ABNORMAL /HPF
WBC'S/HIGH POWER FIELD, WET PREP: ABNORMAL
YEAST, WET PREP: PRESENT

## 2025-05-21 PROCEDURE — 3075F SYST BP GE 130 - 139MM HG: CPT | Performed by: PHYSICIAN ASSISTANT

## 2025-05-21 PROCEDURE — 3079F DIAST BP 80-89 MM HG: CPT | Performed by: PHYSICIAN ASSISTANT

## 2025-05-21 PROCEDURE — 99204 OFFICE O/P NEW MOD 45 MIN: CPT | Performed by: PHYSICIAN ASSISTANT

## 2025-05-21 PROCEDURE — 81001 URINALYSIS AUTO W/SCOPE: CPT | Performed by: PHYSICIAN ASSISTANT

## 2025-05-21 PROCEDURE — 87210 SMEAR WET MOUNT SALINE/INK: CPT | Performed by: PHYSICIAN ASSISTANT

## 2025-05-21 RX ORDER — FLUCONAZOLE 150 MG/1
150 TABLET ORAL ONCE
Qty: 1 TABLET | Refills: 0 | Status: SHIPPED | OUTPATIENT
Start: 2025-05-21 | End: 2025-05-21

## 2025-05-21 ASSESSMENT — ENCOUNTER SYMPTOMS
NAUSEA: 0
FREQUENCY: 0
MUSCULOSKELETAL NEGATIVE: 1
DIZZINESS: 0
COUGH: 0
NEUROLOGICAL NEGATIVE: 1
WEAKNESS: 0
RHINORRHEA: 0
ACTIVITY CHANGE: 0
ADENOPATHY: 0
ABDOMINAL PAIN: 0
LIGHT-HEADEDNESS: 0
MYALGIAS: 0
DIARRHEA: 0
HEADACHES: 0
CHILLS: 0
PALPITATIONS: 0
NECK STIFFNESS: 0
FATIGUE: 0
VOMITING: 0
HEMATURIA: 0
CARDIOVASCULAR NEGATIVE: 1
POLYDIPSIA: 0
DYSURIA: 0
CONSTITUTIONAL NEGATIVE: 1
RESPIRATORY NEGATIVE: 1
NECK PAIN: 0
SHORTNESS OF BREATH: 0
ENDOCRINE NEGATIVE: 1
FEVER: 0
GASTROINTESTINAL NEGATIVE: 1
FLANK PAIN: 0
SORE THROAT: 0

## 2025-05-21 NOTE — PROGRESS NOTES
Chief Complaint:    Chief Complaint   Patient presents with    Vaginal Problem     Yeast Infection      ASSESSMENT  1. Yeast infection of the vagina       PLAN    Urinalysis discussed with patient  Wet prep was positive for yeast.  Rx for Diflucan today  Follow up with PCP in 1 week if symptoms are not improving.  Worrisome symptoms discussed with instructions to go to the ED.  Patient verbalized understanding and agreed with this plan.    Labs:     Results for orders placed or performed in visit on 05/21/25   UA Macroscopic with reflex to Microscopic and Culture - Lab Collect     Status: Abnormal    Specimen: Urine, Midstream   Result Value Ref Range    Color Urine Yellow Colorless, Straw, Light Yellow, Yellow    Appearance Urine Clear Clear    Glucose Urine Negative Negative mg/dL    Bilirubin Urine Negative Negative    Ketones Urine Negative Negative mg/dL    Specific Gravity Urine 1.015 1.003 - 1.035    Blood Urine Negative Negative    pH Urine 6.0 5.0 - 7.0    Protein Albumin Urine Negative Negative mg/dL    Urobilinogen Urine 0.2 0.2, 1.0 E.U./dL    Nitrite Urine Negative Negative    Leukocyte Esterase Urine Trace (A) Negative   UA Microscopic with Reflex to Culture     Status: Abnormal   Result Value Ref Range    RBC Urine 2-5 (A) 0-2 /HPF /HPF    WBC Urine 0-5 0-5 /HPF /HPF    Squamous Epithelials Urine Few (A) None Seen /LPF    Narrative    Urine Culture not indicated   Wet prep - lab collect     Status: Abnormal    Specimen: Vagina; Swab   Result Value Ref Range    Trichomonas Absent Absent    Yeast Present (A) Absent    Clue Cells Absent Absent    WBCs/high power field 2+ (A) None       Problem history    Patient Active Problem List   Diagnosis    Fibromyalgia    Gastroesophageal reflux disease without esophagitis    Anxiety and depression    Post partum depression       Current Meds    Current Outpatient Medications:     cholecalciferol (EQL VITAMIN D3) 125 MCG (5000 UT) CAPS, Take 1 capsule (5,000  Units) by mouth daily., Disp: 90 capsule, Rfl: 3    clobetasol (TEMOVATE) 0.05 % external solution, Apply to scalp daily as needed itching., Disp: , Rfl:     cyanocobalamin (VITAMIN B-12) 1000 MCG sublingual tablet, Place 1 tablet (1,000 mcg) under the tongue daily., Disp: 90 tablet, Rfl: 3    DULoxetine (CYMBALTA) 30 MG capsule, , Disp: , Rfl:     DULoxetine (CYMBALTA) 60 MG capsule, Take 60 mg by mouth., Disp: , Rfl:     fluconazole (DIFLUCAN) 150 MG tablet, Take 1 tablet (150 mg) by mouth once for 1 dose., Disp: 1 tablet, Rfl: 0    FLUoxetine (PROZAC) 40 MG capsule, Take 40 mg by mouth., Disp: , Rfl:     hydrOXYzine HCl (ATARAX) 25 MG tablet, Take 25 mg by mouth., Disp: , Rfl:     ketoconazole (NIZORAL) 2 % external shampoo, Lather on damp scalp, leave on for 5min, then rinse with water., Disp: , Rfl:     phentermine (ADIPEX-P) 37.5 MG tablet, Take 0.5-1 tablets (18.75-37.5 mg) by mouth every morning (before breakfast)., Disp: 90 tablet, Rfl: 1    Allergies  Allergies   Allergen Reactions    Erythromycin Hives, Itching and Unknown    Hepatitis B Vaccine Recomb (3-Antigen) Unknown    Pertussis Vaccine Unknown    Bupropion Anxiety, Confusion, Difficulty breathing, Fatigue, Hives, Itching, Other (See Comments) and Rash     Possible allergy, or the Keflex   Possible allergy, or the Keflex    Possible allergy, or the Keflex    Cephalexin Anxiety, Confusion, Fatigue, Hives, Itching, Muscle Pain (Myalgia), Other (See Comments) and Rash     Possible allergy or Wellbutrin    Possible allergy or Wellbutrin     Possible allergy or Wellbutrin    Pt tolerated IV Cefazolin/Ancef at  without issue  &        SUBJECTIVE    HPI:  Alexandra Lira is a 34 year old female who has symptoms of vaginal burning, itching and vaginal discharge for 1 day(s).  she denies back pain, nausea, vomiting, fever, and chills, flank pain, and vaginal odor.    ROS:      Review of Systems   Constitutional: Negative.  Negative for  activity change, chills, fatigue and fever.   HENT:  Negative for congestion, ear pain, rhinorrhea and sore throat.    Respiratory: Negative.  Negative for cough and shortness of breath.    Cardiovascular: Negative.  Negative for chest pain and palpitations.   Gastrointestinal: Negative.  Negative for abdominal pain, diarrhea, nausea and vomiting.   Endocrine: Negative.  Negative for polydipsia and polyuria.   Genitourinary:  Positive for vaginal discharge. Negative for dysuria, flank pain, frequency, hematuria, pelvic pain, urgency and vaginal pain.   Musculoskeletal: Negative.  Negative for myalgias, neck pain and neck stiffness.   Allergic/Immunologic: Negative for immunocompromised state.   Neurological: Negative.  Negative for dizziness, weakness, light-headedness and headaches.   Hematological:  Negative for adenopathy.       Family History   Family History   Problem Relation Age of Onset    Depression Mother     Anxiety Disorder Mother     Hyperlipidemia Mother     Anal fissures Mother     Peripheral Neuropathy Mother     Fibromyalgia Mother     Obesity Father     Anxiety Disorder Sister     Depression Sister     Attention Deficit Disorder Sister     Alzheimer Disease Paternal Grandmother     Lung Cancer Paternal Grandfather     Autism Spectrum Disorder Son         Social History  Social History     Socioeconomic History    Marital status:      Spouse name: Not on file    Number of children: Not on file    Years of education: Not on file    Highest education level: Not on file   Occupational History    Not on file   Tobacco Use    Smoking status: Never    Smokeless tobacco: Never   Substance and Sexual Activity    Alcohol use: Not Currently     Alcohol/week: 0.0 - 3.0 standard drinks of alcohol     Comment: special occasions    Drug use: Yes     Types: Marijuana     Comment: gummi for sleep    Sexual activity: Not on file   Other Topics Concern    Not on file   Social History Narrative    . Has  a boy (Paxton) born with autism and apraxia I 2019 and daugher (Светлана) born 2022.    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  works out of state as a [employer] (installs big [employer])    Lives at home with kids,  and cat. Grandparents provide  Mo and Tu.     Social Drivers of Health     Financial Resource Strain: Low Risk  (6/3/2024)    Received from [facility]    Financial Resource Strain     Difficulty of Paying Living Expenses: 3     Difficulty of Paying Living Expenses: Not on file   Food Insecurity: No Food Insecurity (6/3/2024)    Received from [facility]    Food Insecurity     Do you worry your food will run out before you are able to buy more?: 1   Transportation Needs: No Transportation Needs (6/3/2024)    Received from [facility]    Transportation Needs     Does lack of transportation keep you from medical appointments?: 1     Does lack of transportation keep you from work, meetings or getting things that you need?: 1   Physical Activity: Insufficiently Active (7/1/2019)    [facility]    Exercise Vital Sign     Days of Exercise per Week: 1 day     Minutes of Exercise per Session: 20 min   Stress: No Stress Concern Present (7/1/2019)    Received from [facility]    [employer] - Occupational Stress Questionnaire     Feeling of Stress : Only a little   Social Connections: Socially Integrated (6/3/2024)    [facility]    Social Connections     Do you often feel lonely or isolated from those around you?: 0   Interpersonal Safety: Not At Risk (6/9/2024)    Received from [facility]     Humiliation, Afraid, Rape, and Kick questionnaire     Fear of Current or Ex-Partner: No     Emotionally Abused: No     Physically  "Abused: No     Sexually Abused: No   Housing Stability: Low Risk  (6/3/2024)    [facility]    Housing Stability     What is your housing situation today?: 1           OBJECTIVE     Vital signs noted and reviewed by Pipo Barajas PA-C  /84 (BP Location: Right arm, Patient Position: Sitting, Cuff Size: Adult Large)   Pulse 94   Temp 98  F (36.7  C) (Tympanic)   Resp 18   Ht 1.6 m (5' 3\")   Wt 108 kg (238 lb)   SpO2 98%   BMI 42.16 kg/m       Physical Exam  Vitals and nursing note reviewed.   Constitutional:       General: She is not in acute distress.     Appearance: Normal appearance. She is well-developed. She is not ill-appearing, toxic-appearing or diaphoretic.   HENT:      Head: Normocephalic and atraumatic.      Right Ear: Tympanic membrane and external ear normal.      Left Ear: Tympanic membrane and external ear normal.   Eyes:      Pupils: Pupils are equal, round, and reactive to light.   Cardiovascular:      Rate and Rhythm: Normal rate and regular rhythm.      Heart sounds: Normal heart sounds. No murmur heard.     No friction rub. No gallop.   Pulmonary:      Effort: Pulmonary effort is normal. No respiratory distress.      Breath sounds: Normal breath sounds. No wheezing or rales.   Chest:      Chest wall: No tenderness.   Abdominal:      General: Bowel sounds are normal. There is no distension.      Palpations: Abdomen is soft. Abdomen is not rigid. There is no mass.      Tenderness: There is no abdominal tenderness. There is no guarding or rebound. Negative signs include Murcia's sign and McBurney's sign.   Musculoskeletal:      Cervical back: Normal range of motion and neck supple.   Lymphadenopathy:      Cervical: No cervical adenopathy.   Skin:     General: Skin is warm and dry.   Neurological:      Mental Status: She is alert and oriented to person, place, and time.      Cranial Nerves: No cranial nerve deficit.      Deep Tendon " Reflexes: Reflexes are normal and symmetric.   Psychiatric:         Behavior: Behavior normal. Behavior is cooperative.         Thought Content: Thought content normal.         Judgment: Judgment normal.             Pipo Barajas PA-C  5/21/2025, 5:36 PM

## 2025-06-12 ENCOUNTER — VIRTUAL VISIT (OUTPATIENT)
Dept: SURGERY | Facility: CLINIC | Age: 35
End: 2025-06-12
Payer: COMMERCIAL

## 2025-06-12 DIAGNOSIS — Z71.3 PRE-BARIATRIC SURGERY NUTRITION EVALUATION: ICD-10-CM

## 2025-06-12 DIAGNOSIS — E66.813 OBESITY, CLASS III, BMI 40-49.9 (MORBID OBESITY) (H): Primary | ICD-10-CM

## 2025-06-12 NOTE — PROGRESS NOTES
"Olya Mccullough is a 34 year old who is being evaluated via a billable video visit.      How would you like to obtain your AVS? MyChart  If the video visit is dropped, the invitation should be resent by: Text to cell phone: [phone]  Will anyone else be joining your video visit? No         Follow Up Surgical Weight Loss Supervised Diet Evaluation    Assessment:  Pt. is being seen today for a follow up RD nutritional evaluation. Pt. has been unsuccessful with non-surgical weight loss methods and is interested in bariatric surgery. Today we reviewed current eating habits and level of physical activity, and instructed on the changes that are required for successful bariatric outcomes.    Surgery of interest per pt: undecided.    Weight loss Medication: Phentermine     Workflow review:  Support Group: Not completed.  Psychology:Completed.  Lab work:Completed.  SWL:Yes 6/6      Weight goal: At or below initial.    Anthropometrics:  Pt's weight is 238 lbs  Initial weight: 230 lbs  Weight change: 8 lbs gain   Estimated body mass index is 42.16 kg/m  as calculated from the following:    Height as of 5/21/25: 1.6 m (5' 3\").    Weight as of 5/21/25: 108 kg (238 lb).    Ideal body weight: 51.3 kg (112 lb 15.8 oz)  Adjusted ideal body weight: 72.9 kg (160 lb 12.7 oz)    Medical History:  Patient Active Problem List   Diagnosis    Fibromyalgia    Gastroesophageal reflux disease without esophagitis    Anxiety and depression    Post partum depression          Progress over past month: Patient reports she feels she is doing OK. Reports she is trying to switch to the zero calorie, zero caffeine beverages from [employer]. Noted she is eating less carbohydrates (bread). Has stopped getting Starbucks on the weekends.  - continues to consume caffeine daily.  - needs to continue practicing balanced meals with protein, vegetable/fruit and carbohydrate        Diet Recall/Time  Breakfast: frozen egg sandwich   Lunch: protein smoothie " "with added fruit, greek yogurt and protein powder and almond milk or pre made salad with chicken or sandwich on \"whole grain white bread\"  Dinner: pan fish (provided by her dad from fishing) with fries (planning to switch to greed beans or carrots     Typical Snacks: grapes, apples,     Overnight eating: No - this is been eliminated     Eating out: 1-2x per week fast food (quick meal)     Meal duration: 30 minutes.      fluids by 30 minutes before, during meal, and waiting 30 minutes after meal before drinking fluids: No - this is something she continues to work on     Beverages  Strawberry Acai drink from [employer] - 3x per week   0-1 can of diet coke per day - planning to switch to caffeine free \" subs   Water 32-48 oz   Super green powder     Exercise  No routine at this time     PES statement:   Morbid obesity related to excessive energy intake as evidenced by Intake of high caloric density foods/beverages (juice, soda, alcohol) at meals and/or snacks; large portions; frequent grazing; Estimated intake that exceeds estimated daily energy intake; Binge eating patterns; Frequent excessive fast food or restaurant intake; and BMI 42          Food/Nutrient Delivery:  Educated patient on eating three meals, with cutting out snacking.  Bariatric Plate: Patient and I discussed the importance of including a lean protein source (20-30 grams/meal), vegetables (included at lunch and dinner), one serving (15g) of carbohydrate, and limited added fat (1 tb/day) at each meal.   Educated patient on using a protein powder drink as a meal replacement and/or supplement after bariatric surgery.   Discussed importance of adequate hydration after surgery, with goal of at least 64 oz of fluids/day.  Addressed avoiding all carbonated, caffeinated and sweetened drinks to prepare for bariatric surgery.     Nutrition Counseling:  Mindful eating techniques: Encouraged slow meal pace, chewing foods to applesauce consistency " for 20-30 minutes/meal.   Discussed  fluids 30 minutes before, during, and after meal to prevent dumping syndrome and discomfort post bariatric surgery.       Instructions/Goals:       Continue implementing bariatric surgery lifestyle modifications.  Continue limiting caffeine intake   Incorporate vegetables with dinner consistently  Practice preparing breakfast at home   Practice  fluids from meals         Handouts Provided:   none    Monitor/Evaluation:  Pt. s target weight:  no gain from initial visit, pt. verbalized understanding.         Plan for next visit:   Follow up on goals set       Video-Visit Details    Type of service:  Video Visit    Video Start Time (time video started): 8:30 AM    Video End Time (time video stopped): 8:52 AM    Originating Location (pt. Location): Home      Distant Location (provider location):  Off-site    Mode of Communication:  Video Conference via [facility]    Physician has received verbal consent for a Video Visit from the patient? Yes      Annia Washington RD

## 2025-06-12 NOTE — LETTER
"6/12/2025      Olya Mccullough  [address]  [place] [address]      Dear Colleague,    Thank you for referring your patient, Olya Mccullough, to the [facility] [place]. Please see a copy of my visit note below.    Olya Mccullough is a 34 year old who is being evaluated via a billable video visit.      How would you like to obtain your AVS? MyChart  If the video visit is dropped, the invitation should be resent by: Text to cell phone: [phone]  Will anyone else be joining your video visit? No         Follow Up Surgical Weight Loss Supervised Diet Evaluation    Assessment:  Pt. is being seen today for a follow up RD nutritional evaluation. Pt. has been unsuccessful with non-surgical weight loss methods and is interested in bariatric surgery. Today we reviewed current eating habits and level of physical activity, and instructed on the changes that are required for successful bariatric outcomes.    Surgery of interest per pt: undecided.    Weight loss Medication: Phentermine     Workflow review:  Support Group: Not completed.  Psychology:Completed.  Lab work:Completed.  SWL:Yes 6/6      Weight goal: At or below initial.    Anthropometrics:  Pt's weight is 238 lbs  Initial weight: 230 lbs  Weight change: 8 lbs gain   Estimated body mass index is 42.16 kg/m  as calculated from the following:    Height as of 5/21/25: 1.6 m (5' 3\").    Weight as of 5/21/25: 108 kg (238 lb).    Ideal body weight: 51.3 kg (112 lb 15.8 oz)  Adjusted ideal body weight: 72.9 kg (160 lb 12.7 oz)    Medical History:  Patient Active Problem List   Diagnosis     Fibromyalgia     Gastroesophageal reflux disease without esophagitis     Anxiety and depression     Post partum depression          Progress over past month: Patient reports she feels she is doing OK. Reports she is trying to switch to the zero calorie, zero caffeine beverages from [employer]. Noted she is eating less carbohydrates " "(bread). Has stopped getting Starbucks on the weekends.  - continues to consume caffeine daily.  - needs to continue practicing balanced meals with protein, vegetable/fruit and carbohydrate        Diet Recall/Time  Breakfast: frozen egg sandwich   Lunch: protein smoothie with added fruit, greek yogurt and protein powder and almond milk or pre made salad with chicken or sandwich on \"whole grain white bread\"  Dinner: pan fish (provided by her dad from fishing) with fries (planning to switch to greed beans or carrots     Typical Snacks: grapes, apples,     Overnight eating: No - this is been eliminated     Eating out: 1-2x per week fast food (quick meal)     Meal duration: 30 minutes.      fluids by 30 minutes before, during meal, and waiting 30 minutes after meal before drinking fluids: No - this is something she continues to work on     Beverages  Strawberry Acai drink from [employer] - 3x per week   0-1 can of diet coke per day - planning to switch to caffeine free \" subs   Water 32-48 oz   Super green powder     Exercise  No routine at this time     PES statement:   Morbid obesity related to excessive energy intake as evidenced by Intake of high caloric density foods/beverages (juice, soda, alcohol) at meals and/or snacks; large portions; frequent grazing; Estimated intake that exceeds estimated daily energy intake; Binge eating patterns; Frequent excessive fast food or restaurant intake; and BMI 42          Food/Nutrient Delivery:  Educated patient on eating three meals, with cutting out snacking.  Bariatric Plate: Patient and I discussed the importance of including a lean protein source (20-30 grams/meal), vegetables (included at lunch and dinner), one serving (15g) of carbohydrate, and limited added fat (1 tb/day) at each meal.   Educated patient on using a protein powder drink as a meal replacement and/or supplement after bariatric surgery.   Discussed importance of adequate hydration after " surgery, with goal of at least 64 oz of fluids/day.  Addressed avoiding all carbonated, caffeinated and sweetened drinks to prepare for bariatric surgery.     Nutrition Counseling:  Mindful eating techniques: Encouraged slow meal pace, chewing foods to applesauce consistency for 20-30 minutes/meal.   Discussed  fluids 30 minutes before, during, and after meal to prevent dumping syndrome and discomfort post bariatric surgery.       Instructions/Goals:       Continue implementing bariatric surgery lifestyle modifications.  Continue limiting caffeine intake   Incorporate vegetables with dinner consistently  Practice preparing breakfast at home   Practice  fluids from meals         Handouts Provided:   none    Monitor/Evaluation:  Pt. s target weight:  no gain from initial visit, pt. verbalized understanding.         Plan for next visit:   Follow up on goals set       Video-Visit Details    Type of service:  Video Visit    Video Start Time (time video started): 8:30 AM    Video End Time (time video stopped): 8:52 AM    Originating Location (pt. Location): Home      Distant Location (provider location):  Off-site    Mode of Communication:  Video Conference via [facility]    Physician has received verbal consent for a Video Visit from the patient? Yes      Annia Washington RD           Again, thank you for allowing me to participate in the care of your patient.        Sincerely,        Annia Washington RD    Electronically signed

## 2025-06-19 ENCOUNTER — OFFICE VISIT (OUTPATIENT)
Dept: FAMILY MEDICINE | Facility: CLINIC | Age: 35
End: 2025-06-19
Payer: COMMERCIAL

## 2025-06-19 VITALS
SYSTOLIC BLOOD PRESSURE: 128 MMHG | RESPIRATION RATE: 16 BRPM | HEART RATE: 102 BPM | TEMPERATURE: 98.1 F | BODY MASS INDEX: 42.52 KG/M2 | OXYGEN SATURATION: 99 % | WEIGHT: 240 LBS | HEIGHT: 63 IN | DIASTOLIC BLOOD PRESSURE: 82 MMHG

## 2025-06-19 DIAGNOSIS — N30.01 ACUTE CYSTITIS WITH HEMATURIA: ICD-10-CM

## 2025-06-19 DIAGNOSIS — R39.9 URINARY SYMPTOM OR SIGN: ICD-10-CM

## 2025-06-19 DIAGNOSIS — M25.50 MULTIPLE JOINT PAIN: ICD-10-CM

## 2025-06-19 DIAGNOSIS — M79.7 FIBROMYALGIA: ICD-10-CM

## 2025-06-19 DIAGNOSIS — R68.2 DRY MOUTH: Primary | ICD-10-CM

## 2025-06-19 DIAGNOSIS — H04.123 DRY EYES: ICD-10-CM

## 2025-06-19 LAB
ALBUMIN UR-MCNC: NEGATIVE MG/DL
ANION GAP SERPL CALCULATED.3IONS-SCNC: 9 MMOL/L (ref 7–15)
APPEARANCE UR: ABNORMAL
BACTERIA #/AREA URNS HPF: ABNORMAL /HPF
BASOPHILS # BLD AUTO: 0 10E3/UL (ref 0–0.2)
BASOPHILS NFR BLD AUTO: 0 %
BILIRUB UR QL STRIP: NEGATIVE
BUN SERPL-MCNC: 11.9 MG/DL (ref 6–20)
CALCIUM SERPL-MCNC: 9 MG/DL (ref 8.8–10.4)
CHLORIDE SERPL-SCNC: 103 MMOL/L (ref 98–107)
CLUE CELLS: ABNORMAL
COLOR UR AUTO: YELLOW
CREAT SERPL-MCNC: 0.78 MG/DL (ref 0.51–0.95)
CRP SERPL-MCNC: 11.7 MG/L
EGFRCR SERPLBLD CKD-EPI 2021: >90 ML/MIN/1.73M2
EOSINOPHIL # BLD AUTO: 0.1 10E3/UL (ref 0–0.7)
EOSINOPHIL NFR BLD AUTO: 1 %
ERYTHROCYTE [DISTWIDTH] IN BLOOD BY AUTOMATED COUNT: 13.5 % (ref 10–15)
ERYTHROCYTE [SEDIMENTATION RATE] IN BLOOD BY WESTERGREN METHOD: 34 MM/HR (ref 0–20)
GLUCOSE SERPL-MCNC: 97 MG/DL (ref 70–99)
GLUCOSE UR STRIP-MCNC: NEGATIVE MG/DL
HCO3 SERPL-SCNC: 26 MMOL/L (ref 22–29)
HCT VFR BLD AUTO: 36.8 % (ref 35–47)
HGB BLD-MCNC: 12 G/DL (ref 11.7–15.7)
HGB UR QL STRIP: NEGATIVE
IMM GRANULOCYTES # BLD: 0 10E3/UL
IMM GRANULOCYTES NFR BLD: 0 %
KETONES UR STRIP-MCNC: NEGATIVE MG/DL
LEUKOCYTE ESTERASE UR QL STRIP: ABNORMAL
LYMPHOCYTES # BLD AUTO: 2.1 10E3/UL (ref 0.8–5.3)
LYMPHOCYTES NFR BLD AUTO: 23 %
MCH RBC QN AUTO: 28 PG (ref 26.5–33)
MCHC RBC AUTO-ENTMCNC: 32.6 G/DL (ref 31.5–36.5)
MCV RBC AUTO: 86 FL (ref 78–100)
MONOCYTES # BLD AUTO: 0.7 10E3/UL (ref 0–1.3)
MONOCYTES NFR BLD AUTO: 8 %
NEUTROPHILS # BLD AUTO: 6.1 10E3/UL (ref 1.6–8.3)
NEUTROPHILS NFR BLD AUTO: 68 %
NITRATE UR QL: NEGATIVE
PH UR STRIP: 8 [PH] (ref 5–7)
PLATELET # BLD AUTO: 267 10E3/UL (ref 150–450)
POTASSIUM SERPL-SCNC: 4.4 MMOL/L (ref 3.4–5.3)
RBC # BLD AUTO: 4.28 10E6/UL (ref 3.8–5.2)
RBC #/AREA URNS AUTO: ABNORMAL /HPF
SODIUM SERPL-SCNC: 138 MMOL/L (ref 135–145)
SP GR UR STRIP: 1.01 (ref 1–1.03)
SQUAMOUS #/AREA URNS AUTO: ABNORMAL /LPF
TRICHOMONAS, WET PREP: ABNORMAL
UROBILINOGEN UR STRIP-ACNC: 0.2 E.U./DL
WBC # BLD AUTO: 8.9 10E3/UL (ref 4–11)
WBC #/AREA URNS AUTO: ABNORMAL /HPF
WBC CLUMPS #/AREA URNS HPF: PRESENT /HPF
WBC'S/HIGH POWER FIELD, WET PREP: ABNORMAL
YEAST, WET PREP: ABNORMAL

## 2025-06-19 RX ORDER — FLUORIDE TOOTHPASTE
5 TOOTHPASTE DENTAL 4 TIMES DAILY
Qty: 100 ML | Refills: 3 | Status: SHIPPED | OUTPATIENT
Start: 2025-06-19

## 2025-06-19 RX ORDER — SULFAMETHOXAZOLE AND TRIMETHOPRIM 800; 160 MG/1; MG/1
1 TABLET ORAL 2 TIMES DAILY
Qty: 6 TABLET | Refills: 0 | Status: SHIPPED | OUTPATIENT
Start: 2025-06-19

## 2025-06-19 ASSESSMENT — PAIN SCALES - GENERAL: PAINLEVEL_OUTOF10: SEVERE PAIN (7)

## 2025-06-19 NOTE — PROGRESS NOTES
Assessment & Plan     Urinary symptom or sign  Urinary frequency, low back pain and tenderness over bladder. Had these symptoms in the past and she believes she had a UTI. No risk STI, no vaginal discharge.   - Wet prep - lab collect; Future  - UA Macroscopic with reflex to Microscopic and Culture - Lab Collect; Future  - UA Macroscopic with reflex to Microscopic and Culture - Lab Collect  - Wet prep - lab collect  - UA Microscopic with Reflex to Culture  - Urine Culture    Acute cystitis with hematuria  Urinary frequency, low back pain and tenderness over bladder. Had these symptoms in the past and she believes she had a UTI. No risk STI, no vaginal discharge.   - sulfamethoxazole-trimethoprim (BACTRIM DS) 800-160 MG tablet; Take 1 tablet by mouth 2 times daily. With daily yogurt or probiotics    Dry eyes  Has seen rheumatologist in the past for her fibromyalgia. Saw ophthalmologist in the past who diagnosed dry eye and recommended she be evaluated for Sjogren. She had labs done in past but they were pretty normal. Increased joint pain; believes she has TMJ. Planning to see dentist, has some dental issues at this time. Discussed dry mouth can increase tooth decay, will try dry mouth wash while awaiting labs and dental visit. Tips given on AVS.   - CBC with platelets and differential; Future  - ESR: Erythrocyte sedimentation rate; Future  - Anti Nuclear Sandra IgG by IFA with Reflex; Future  - Basic metabolic panel  (Ca, Cl, CO2, Creat, Gluc, K, Na, BUN); Future  - CRP, inflammation; Future  - CBC with platelets and differential  - ESR: Erythrocyte sedimentation rate  - Anti Nuclear Sandra IgG by IFA with Reflex  - Basic metabolic panel  (Ca, Cl, CO2, Creat, Gluc, K, Na, BUN)  - CRP, inflammation    Dry mouth  Has seen rheumatologist in the past for her fibromyalgia. Saw ophthalmologist in the past who diagnosed dry eye and recommended she be evaluated for Sjogren. She had labs done in past but they were pretty normal.  Increased joint pain; believes she has TMJ. Planning to see dentist, has some dental issues at this time. Discussed dry mouth can increase tooth decay, will try dry mouth wash while awaiting labs and dental visit. Tips given on AVS.   - CBC with platelets and differential; Future  - ESR: Erythrocyte sedimentation rate; Future  - Anti Nuclear Sandra IgG by IFA with Reflex; Future  - Basic metabolic panel  (Ca, Cl, CO2, Creat, Gluc, K, Na, BUN); Future  - CRP, inflammation; Future  - Mouthwashes (BIOTENE/CALCIUM PBF) LIQD; Take 5 mLs by mouth 4 times daily. Rinse, gargle and spit out  - CBC with platelets and differential  - ESR: Erythrocyte sedimentation rate  - Anti Nuclear Sandra IgG by IFA with Reflex  - Basic metabolic panel  (Ca, Cl, CO2, Creat, Gluc, K, Na, BUN)  - CRP, inflammation    Multiple joint pain  Has seen rheumatologist in the past for her fibromyalgia. Saw ophthalmologist in the past who diagnosed dry eye and recommended she be evaluated for Sjogren. She had labs done in past but they were pretty normal. Increased joint pain; believes she has TMJ. Planning to see dentist, has some dental issues at this time. Discussed dry mouth can increase tooth decay, will try dry mouth wash while awaiting labs and dental visit. Tips given on AVS.   - CBC with platelets and differential; Future  - ESR: Erythrocyte sedimentation rate; Future  - Anti Nuclear Sandra IgG by IFA with Reflex; Future  - Basic metabolic panel  (Ca, Cl, CO2, Creat, Gluc, K, Na, BUN); Future  - CRP, inflammation; Future  - CBC with platelets and differential  - ESR: Erythrocyte sedimentation rate  - Anti Nuclear Sandra IgG by IFA with Reflex  - Basic metabolic panel  (Ca, Cl, CO2, Creat, Gluc, K, Na, BUN)  - CRP, inflammation    Fibromyalgia    - CBC with platelets and differential; Future  - ESR: Erythrocyte sedimentation rate; Future  - Anti Nuclear Sandra IgG by IFA with Reflex; Future  - Basic metabolic panel  (Ca, Cl, CO2, Creat, Gluc, K, Na, BUN);  "Future  - CRP, inflammation; Future  - CBC with platelets and differential  - ESR: Erythrocyte sedimentation rate  - Anti Nuclear Sandra IgG by IFA with Reflex  - Basic metabolic panel  (Ca, Cl, CO2, Creat, Gluc, K, Na, BUN)  - CRP, inflammation    BMI  Estimated body mass index is 42.51 kg/m  as calculated from the following:    Height as of this encounter: 1.6 m (5' 3\").    Weight as of this encounter: 108.9 kg (240 lb).         Follow-up  Return in about 3 months (around 9/19/2025), or if symptoms worsen or fail to improve.    Zac Dobson is a 34 year old, presenting for the following health issues:  History of Present Illness (weight, UTI, back pain, GI pain, nerve pain \"electric shock\", teeth concern, dry eye, rash)        6/19/2025    11:12 AM   Additional Questions   Roomed by Nava Brambila CMA     History of Present Illness       Back Pain:  She presents for follow up of back pain. Patient's back pain is a chronic problem.  Location of back pain:  Right lower back, left lower back, right upper back, left upper back, right shoulder, left shoulder, right hip and left hip  Description of back pain: fullness, sharp and shooting  Back pain spreads: right shoulder and left shoulder    Since patient first noticed back pain, pain is: gradually worsening  Does back pain interfere with her job:  Yes       Reason for visit:  Possible uti, back pain and stomach issues  Symptom onset:  More than a month  Symptoms include:  Chronic back pain, constant urge to urinate , insomina, dry mouth, muscle pain  Symptom intensity:  Moderate  Symptom progression:  Worsening  Had these symptoms before:  Yes  Has tried/received treatment for these symptoms:  Yes  Previous treatment was successful:  No  What makes it worse:  No sleep  What makes it better:  Getting good sleep   She is taking medications regularly.            Review of Systems  Constitutional, HEENT, cardiovascular, pulmonary, GI, , musculoskeletal, neuro, " "skin, endocrine and psych systems are negative, except as otherwise noted.      Objective    /82 (BP Location: Left arm, Patient Position: Sitting, Cuff Size: Adult Large)   Pulse 102   Temp 98.1  F (36.7  C) (Temporal)   Resp 16   Ht 1.6 m (5' 3\")   Wt 108.9 kg (240 lb)   LMP 06/03/2025 (Approximate)   SpO2 99%   BMI 42.51 kg/m    Body mass index is 42.51 kg/m .  Physical Exam   GENERAL: alert and no distress  EYES: Eyes grossly normal to inspection, PERRL and conjunctivae and sclerae normal  ENT: POSITIVE poor dentition, gum disease present, dry oral mucous membranes  RESP: lungs clear to auscultation - no rales, rhonchi or wheezes  CV: regular rate and rhythm, normal S1 S2, no S3 or S4, no murmur, click or rub, no peripheral edema  ABDOMEN: soft, nontender, no hepatosplenomegaly, no masses and bowel sounds normal; no tenderness over bladder  MS: no cva tenderness, no redness, warmth, swelling of joints. POSITIVE pain with palpation of bilateral lumbar spine  SKIN: POSITIVE dryness and cracking of soles of feet, redness and dryness of skin to top of feet  PSYCH: mentation appears normal, affect normal/patient has difficulty describing how she is feeling which she states is normal for her. Limited eye contact.     See orders        Signed Electronically by: ANICETO KERR    "

## 2025-06-20 ENCOUNTER — RESULTS FOLLOW-UP (OUTPATIENT)
Dept: FAMILY MEDICINE | Facility: CLINIC | Age: 35
End: 2025-06-20

## 2025-06-22 DIAGNOSIS — G47.00 FREQUENT NOCTURNAL AWAKENING: ICD-10-CM

## 2025-06-22 DIAGNOSIS — R40.0 DAYTIME SOMNOLENCE: Primary | ICD-10-CM

## 2025-06-23 ENCOUNTER — PATIENT OUTREACH (OUTPATIENT)
Dept: CARE COORDINATION | Facility: CLINIC | Age: 35
End: 2025-06-23
Payer: COMMERCIAL

## 2025-07-08 ENCOUNTER — MYC MEDICAL ADVICE (OUTPATIENT)
Dept: FAMILY MEDICINE | Facility: CLINIC | Age: 35
End: 2025-07-08
Payer: COMMERCIAL

## 2025-07-09 DIAGNOSIS — F32.A ANXIETY AND DEPRESSION: ICD-10-CM

## 2025-07-09 DIAGNOSIS — M79.7 FIBROMYALGIA: Primary | ICD-10-CM

## 2025-07-09 DIAGNOSIS — F41.9 ANXIETY AND DEPRESSION: ICD-10-CM

## 2025-07-09 RX ORDER — FLUOXETINE HYDROCHLORIDE 40 MG/1
40 CAPSULE ORAL DAILY
Qty: 30 CAPSULE | Refills: 4 | Status: SHIPPED | OUTPATIENT
Start: 2025-07-09

## 2025-07-09 RX ORDER — HYDROXYZINE HYDROCHLORIDE 25 MG/1
25 TABLET, FILM COATED ORAL 3 TIMES DAILY PRN
Qty: 60 TABLET | Refills: 4 | Status: SHIPPED | OUTPATIENT
Start: 2025-07-09

## 2025-07-09 RX ORDER — DULOXETIN HYDROCHLORIDE 30 MG/1
30 CAPSULE, DELAYED RELEASE ORAL 2 TIMES DAILY
Qty: 60 CAPSULE | Refills: 4 | Status: SHIPPED | OUTPATIENT
Start: 2025-07-09

## 2025-07-30 ENCOUNTER — OFFICE VISIT (OUTPATIENT)
Dept: URGENT CARE | Facility: URGENT CARE | Age: 35
End: 2025-07-30
Payer: COMMERCIAL

## 2025-07-30 VITALS
WEIGHT: 250 LBS | BODY MASS INDEX: 44.29 KG/M2 | OXYGEN SATURATION: 100 % | DIASTOLIC BLOOD PRESSURE: 87 MMHG | RESPIRATION RATE: 14 BRPM | SYSTOLIC BLOOD PRESSURE: 128 MMHG | HEART RATE: 103 BPM | TEMPERATURE: 97.6 F

## 2025-07-30 DIAGNOSIS — R07.9 CHEST PAIN, UNSPECIFIED TYPE: Primary | ICD-10-CM

## 2025-07-30 DIAGNOSIS — Z53.21 PATIENT LEFT AFTER TRIAGE: ICD-10-CM

## 2025-07-30 PROCEDURE — 99207 PR NO CHARGE LOS: CPT | Performed by: FAMILY MEDICINE

## 2025-07-30 PROCEDURE — 3074F SYST BP LT 130 MM HG: CPT | Performed by: FAMILY MEDICINE

## 2025-07-30 PROCEDURE — 93000 ELECTROCARDIOGRAM COMPLETE: CPT | Performed by: FAMILY MEDICINE

## 2025-07-30 PROCEDURE — 3079F DIAST BP 80-89 MM HG: CPT | Performed by: FAMILY MEDICINE

## 2025-07-31 NOTE — PROGRESS NOTES
Urgent Care Clinic Visit  Rapid rooming was initiated.  Provider was consulted, patient will remain in room and provider will be with patient as soon as possible.  Chief Complaint   Patient presents with    Chest Pain     Pt states that since this morning she has been having tightness in her chest and super tired. She has also been having cold chills but no fever.                 7/30/2025     7:03 PM   Additional Questions   Roomed by Sultana   Accompanied by Son         Patient having off and on chest tightness.  Unable to obtain an EKG because of technical difficulties.  With chest tightness recommend ER evaluation for complete acute cardiorespiratory rule out.   Patient recommended ambulance, she declines. Risks discussed.  Family possibly to transport her.    Sabina Rice M.D.

## 2025-08-01 ENCOUNTER — ANCILLARY PROCEDURE (OUTPATIENT)
Dept: GENERAL RADIOLOGY | Facility: CLINIC | Age: 35
End: 2025-08-01
Attending: PHYSICIAN ASSISTANT
Payer: COMMERCIAL

## 2025-08-01 ENCOUNTER — MYC MEDICAL ADVICE (OUTPATIENT)
Dept: RHEUMATOLOGY | Facility: CLINIC | Age: 35
End: 2025-08-01

## 2025-08-01 DIAGNOSIS — M25.50 MULTIPLE JOINT PAIN: ICD-10-CM

## 2025-08-01 DIAGNOSIS — R68.2 DRY MOUTH: ICD-10-CM

## 2025-08-01 DIAGNOSIS — H04.123 DRY EYES: ICD-10-CM

## 2025-08-01 PROCEDURE — 73130 X-RAY EXAM OF HAND: CPT | Mod: TC | Performed by: RADIOLOGY

## 2025-08-01 PROCEDURE — 73630 X-RAY EXAM OF FOOT: CPT | Mod: TC | Performed by: RADIOLOGY

## 2025-08-01 PROCEDURE — 72200 X-RAY EXAM SI JOINTS: CPT | Mod: TC | Performed by: RADIOLOGY

## 2025-08-06 ENCOUNTER — PATIENT OUTREACH (OUTPATIENT)
Dept: CARE COORDINATION | Facility: CLINIC | Age: 35
End: 2025-08-06
Payer: COMMERCIAL

## 2025-08-14 ENCOUNTER — VIRTUAL VISIT (OUTPATIENT)
Dept: SURGERY | Facility: CLINIC | Age: 35
End: 2025-08-14
Payer: COMMERCIAL

## 2025-08-14 DIAGNOSIS — E66.813 OBESITY, CLASS III, BMI 40-49.9 (MORBID OBESITY) (H): Primary | ICD-10-CM
